# Patient Record
Sex: MALE | Race: WHITE | NOT HISPANIC OR LATINO | Employment: OTHER | URBAN - METROPOLITAN AREA
[De-identification: names, ages, dates, MRNs, and addresses within clinical notes are randomized per-mention and may not be internally consistent; named-entity substitution may affect disease eponyms.]

---

## 2018-08-07 ENCOUNTER — HOSPITAL ENCOUNTER (OUTPATIENT)
Dept: URGENT CARE | Facility: CLINIC | Age: 73
Setting detail: SPECIMEN
Discharge: HOME | End: 2018-08-07
Attending: FAMILY MEDICINE | Admitting: FAMILY MEDICINE

## 2018-08-07 LAB
BACTERIA SPEC AEROBE CULT: NORMAL
GRAM STN SPEC: NORMAL
Lab: NORMAL
MICRO REPORT STATUS: NORMAL
SPECIMEN SOURCE: NORMAL

## 2018-08-21 ENCOUNTER — HOSPITAL ENCOUNTER (OUTPATIENT)
Dept: URGENT CARE | Facility: CLINIC | Age: 73
Setting detail: SPECIMEN
Discharge: HOME | End: 2018-08-21
Attending: FAMILY MEDICINE | Admitting: FAMILY MEDICINE

## 2019-08-01 ENCOUNTER — APPOINTMENT (OUTPATIENT)
Dept: CT IMAGING | Facility: HOSPITAL | Age: 74
End: 2019-08-01

## 2019-08-01 ENCOUNTER — HOSPITAL ENCOUNTER (EMERGENCY)
Facility: HOSPITAL | Age: 74
Discharge: HOME | End: 2019-08-01
Admitting: EMERGENCY MEDICINE

## 2019-08-01 VITALS
BODY MASS INDEX: 35.28 KG/M2 | RESPIRATION RATE: 16 BRPM | HEIGHT: 71 IN | WEIGHT: 252 LBS | SYSTOLIC BLOOD PRESSURE: 207 MMHG | OXYGEN SATURATION: 99 % | HEART RATE: 69 BPM | TEMPERATURE: 98.2 F | DIASTOLIC BLOOD PRESSURE: 93 MMHG

## 2019-08-01 DIAGNOSIS — R11.2 NAUSEA AND VOMITING, INTRACTABILITY OF VOMITING NOT SPECIFIED, UNSPECIFIED VOMITING TYPE: ICD-10-CM

## 2019-08-01 DIAGNOSIS — R10.30 LOWER ABDOMINAL PAIN: Primary | ICD-10-CM

## 2019-08-01 DIAGNOSIS — N28.1 RENAL CYST, RIGHT: ICD-10-CM

## 2019-08-01 DIAGNOSIS — N20.0 URINARY TRACT OBSTRUCTION BY KIDNEY STONE: ICD-10-CM

## 2019-08-01 DIAGNOSIS — N13.8 URINARY TRACT OBSTRUCTION BY KIDNEY STONE: ICD-10-CM

## 2019-08-01 LAB
ALBUMIN SERPL-MCNC: 4.1 G/DL (ref 3.5–4.8)
ALBUMIN/GLOB SERPL: 1.6 G/DL (ref 1–1.7)
ALP SERPL-CCNC: 54 U/L (ref 32–91)
ALT SERPL W P-5'-P-CCNC: 19 U/L (ref 17–63)
ANION GAP SERPL CALCULATED.3IONS-SCNC: 15.4 MMOL/L (ref 5–15)
AST SERPL-CCNC: 20 U/L (ref 15–41)
BACTERIA UR QL AUTO: ABNORMAL /HPF
BASOPHILS # BLD AUTO: 0 10*3/MM3 (ref 0–0.2)
BASOPHILS NFR BLD AUTO: 0.2 % (ref 0–1.5)
BILIRUB SERPL-MCNC: 1 MG/DL (ref 0.3–1.2)
BILIRUB UR QL STRIP: NEGATIVE
BUN BLD-MCNC: 22 MG/DL (ref 8–20)
BUN/CREAT SERPL: 14.7 (ref 6.2–20.3)
CALCIUM SPEC-SCNC: 9.6 MG/DL (ref 8.9–10.3)
CHLORIDE SERPL-SCNC: 103 MMOL/L (ref 101–111)
CLARITY UR: CLEAR
CO2 SERPL-SCNC: 24 MMOL/L (ref 22–32)
COLOR UR: YELLOW
CREAT BLD-MCNC: 1.5 MG/DL (ref 0.7–1.2)
DEPRECATED RDW RBC AUTO: 42.4 FL (ref 37–54)
EOSINOPHIL # BLD AUTO: 0.1 10*3/MM3 (ref 0–0.4)
EOSINOPHIL NFR BLD AUTO: 1.7 % (ref 0.3–6.2)
ERYTHROCYTE [DISTWIDTH] IN BLOOD BY AUTOMATED COUNT: 14.4 % (ref 12.3–15.4)
GFR SERPL CREATININE-BSD FRML MDRD: 46 ML/MIN/1.73
GLOBULIN UR ELPH-MCNC: 2.6 GM/DL (ref 2.5–3.8)
GLUCOSE BLD-MCNC: 157 MG/DL (ref 65–99)
GLUCOSE UR STRIP-MCNC: NEGATIVE MG/DL
HCT VFR BLD AUTO: 34.1 % (ref 37.5–51)
HGB BLD-MCNC: 11.4 G/DL (ref 13–17.7)
HGB UR QL STRIP.AUTO: ABNORMAL
HOLD SPECIMEN: NORMAL
HOLD SPECIMEN: NORMAL
HYALINE CASTS UR QL AUTO: ABNORMAL /LPF
KETONES UR QL STRIP: NEGATIVE
LEUKOCYTE ESTERASE UR QL STRIP.AUTO: ABNORMAL
LIPASE SERPL-CCNC: 30 U/L (ref 22–51)
LYMPHOCYTES # BLD AUTO: 1 10*3/MM3 (ref 0.7–3.1)
LYMPHOCYTES NFR BLD AUTO: 13.4 % (ref 19.6–45.3)
MCH RBC QN AUTO: 28.1 PG (ref 26.6–33)
MCHC RBC AUTO-ENTMCNC: 33.3 G/DL (ref 31.5–35.7)
MCV RBC AUTO: 84.5 FL (ref 79–97)
MONOCYTES # BLD AUTO: 0.6 10*3/MM3 (ref 0.1–0.9)
MONOCYTES NFR BLD AUTO: 7.8 % (ref 5–12)
NEUTROPHILS # BLD AUTO: 5.7 10*3/MM3 (ref 1.7–7)
NEUTROPHILS NFR BLD AUTO: 76.9 % (ref 42.7–76)
NITRITE UR QL STRIP: NEGATIVE
NRBC BLD AUTO-RTO: 0 /100 WBC (ref 0–0.2)
PH UR STRIP.AUTO: 6 [PH] (ref 5–8)
PLATELET # BLD AUTO: 216 10*3/MM3 (ref 140–450)
PMV BLD AUTO: 9.4 FL (ref 6–12)
POTASSIUM BLD-SCNC: 4.4 MMOL/L (ref 3.6–5.1)
PROT SERPL-MCNC: 6.7 G/DL (ref 6.1–7.9)
PROT UR QL STRIP: ABNORMAL
RBC # BLD AUTO: 4.04 10*6/MM3 (ref 4.14–5.8)
RBC # UR: ABNORMAL /HPF
REF LAB TEST METHOD: ABNORMAL
SODIUM BLD-SCNC: 138 MMOL/L (ref 136–144)
SP GR UR STRIP: 1.03 (ref 1–1.03)
SQUAMOUS #/AREA URNS HPF: ABNORMAL /HPF
UROBILINOGEN UR QL STRIP: ABNORMAL
WBC NRBC COR # BLD: 7.4 10*3/MM3 (ref 3.4–10.8)
WBC UR QL AUTO: ABNORMAL /HPF
WHOLE BLOOD HOLD SPECIMEN: NORMAL
WHOLE BLOOD HOLD SPECIMEN: NORMAL

## 2019-08-01 PROCEDURE — 85025 COMPLETE CBC W/AUTO DIFF WBC: CPT | Performed by: PHYSICIAN ASSISTANT

## 2019-08-01 PROCEDURE — 96374 THER/PROPH/DIAG INJ IV PUSH: CPT

## 2019-08-01 PROCEDURE — 25010000002 ONDANSETRON PER 1 MG: Performed by: PHYSICIAN ASSISTANT

## 2019-08-01 PROCEDURE — 83690 ASSAY OF LIPASE: CPT | Performed by: PHYSICIAN ASSISTANT

## 2019-08-01 PROCEDURE — 74176 CT ABD & PELVIS W/O CONTRAST: CPT

## 2019-08-01 PROCEDURE — 81001 URINALYSIS AUTO W/SCOPE: CPT | Performed by: PHYSICIAN ASSISTANT

## 2019-08-01 PROCEDURE — 80053 COMPREHEN METABOLIC PANEL: CPT | Performed by: PHYSICIAN ASSISTANT

## 2019-08-01 PROCEDURE — 99284 EMERGENCY DEPT VISIT MOD MDM: CPT

## 2019-08-01 RX ORDER — HYDROCODONE BITARTRATE AND ACETAMINOPHEN 5; 325 MG/1; MG/1
1 TABLET ORAL EVERY 6 HOURS PRN
Qty: 12 TABLET | Refills: 0 | Status: SHIPPED | OUTPATIENT
Start: 2019-08-01 | End: 2020-10-26

## 2019-08-01 RX ORDER — ONDANSETRON 4 MG/1
4 TABLET, ORALLY DISINTEGRATING ORAL EVERY 8 HOURS PRN
Qty: 20 TABLET | Refills: 0 | Status: SHIPPED | OUTPATIENT
Start: 2019-08-01 | End: 2020-10-26

## 2019-08-01 RX ORDER — MORPHINE SULFATE 4 MG/ML
4 INJECTION, SOLUTION INTRAMUSCULAR; INTRAVENOUS ONCE
Status: DISCONTINUED | OUTPATIENT
Start: 2019-08-01 | End: 2019-08-01

## 2019-08-01 RX ORDER — SODIUM CHLORIDE 0.9 % (FLUSH) 0.9 %
10 SYRINGE (ML) INJECTION AS NEEDED
Status: DISCONTINUED | OUTPATIENT
Start: 2019-08-01 | End: 2019-08-01 | Stop reason: HOSPADM

## 2019-08-01 RX ORDER — ONDANSETRON 2 MG/ML
4 INJECTION INTRAMUSCULAR; INTRAVENOUS ONCE
Status: COMPLETED | OUTPATIENT
Start: 2019-08-01 | End: 2019-08-01

## 2019-08-01 RX ADMIN — ONDANSETRON 4 MG: 2 INJECTION INTRAMUSCULAR; INTRAVENOUS at 16:14

## 2019-08-01 RX ADMIN — SODIUM CHLORIDE 500 ML: 900 INJECTION, SOLUTION INTRAVENOUS at 16:14

## 2019-08-01 NOTE — ED NOTES
Pt reports lower abdominal pain for 9 days. Pt had few episodes of diarrhea, nausea, & vomiting.      Devika Hillman, RN  08/01/19 5683

## 2019-08-01 NOTE — ED PROVIDER NOTES
Subjective   History of Present Illness  Patient is a 74-year-old male [facility] significant for hypertension, hyperlipidemia, DM2, history of prostate cancer presents with lower abdominal pain for the past 9 days.  States he was sent from urgent care for further work-up.  Patient describes his pain as a constant cramping type pain.  He denies any radiation of the pain.  He denies any alleviating or exacerbating factors of his pain.  Currently rates it a 5/10 in severity.  Reports associated nausea states he did vomit twice yesterday.  Denies any hematemesis.  Also reports watery diarrhea once today.  Denies any melena or hematochezia.  Patient denies any recent travel the country, pain, shortness of air, fever, urinary symptoms, body aches or chills.  Patient reports he was on an antibiotic last month for kidney stones but has finished the full course.  . Review of Systems   Constitutional: Negative.    Respiratory: Negative.    Cardiovascular: Negative.    Gastrointestinal: Positive for abdominal distention, abdominal pain, diarrhea, nausea and vomiting. Negative for blood in stool and constipation.   Genitourinary: Negative for difficulty urinating, dysuria, flank pain and hematuria.   Skin: Negative.    Neurological: Negative.        Past Medical History:   Diagnosis Date   • Diabetes mellitus (CMS/HCC)    • Hypertension        Allergies   Allergen Reactions   • Erythromycin GI Intolerance       Past Surgical History:   Procedure Laterality Date   • BACK SURGERY     • HERNIA REPAIR     • JOINT REPLACEMENT     • ROTATOR CUFF REPAIR         No family history on file.    Social History     Socioeconomic History   • Marital status:      Spouse name: Not on file   • Number of children: Not on file   • Years of education: Not on file   • Highest education level: Not on file   Tobacco Use   • Smoking status: Never Smoker   • Smokeless tobacco: Never Used   Substance and Sexual Activity   • Alcohol use: No      "Frequency: Never   • Drug use: No   • Sexual activity: Defer           Objective   Physical Exam  The patient is well developed, well nourished, alert, cooperative and in no acute distress.    HEENT exam is normocephalic atraumatic. Pupils are equal round and reactive to light. EOMI. Conjunctiva noninjected, sclera anicteric, lids without ptosis, edema, or erythema. Mucous membranes are moist.     Lungs are clear to auscultation bilaterally chest wall expansion equal without retraction    Cardiovascular:  Regular rate and rhythm without murmur there is no peripheral edema, cyanosis or pallor.  Extremities are warm and well perfused.  Capillary refill less than 2 second    Abdomen is soft, generalized tenderness to palpation across lower quadrants, distention noted.  No guarding or rebound noted . Bowel sounds are present.  No hernias or masses are felt.     Back shows no CVA tenderness.  Neck and back show no spinal tenderness or bony step-off.  There is no asymmetry of spinal muscles , no tenderness, decreased range of motion or muscular spasm.      Neurologic:  Alert and oriented without focal neurological deficits.     Skin shows no rash, petechiae, or purpura.    Procedures           ED Course    /82 (BP Location: Right arm, Patient Position: Sitting)   Pulse 62   Temp 98.6 °F (37 °C) (Oral)   Resp 16   Ht 180.3 cm (71\")   Wt 114 kg (252 lb)   SpO2 96%   BMI 35.15 kg/m²   Medications   sodium chloride 0.9 % flush 10 mL (not administered)   sodium chloride 0.9 % bolus 500 mL (not administered)   sodium chloride 0.9 % bolus 500 mL (0 mL Intravenous Stopped 8/1/19 1735)   ondansetron (ZOFRAN) injection 4 mg (4 mg Intravenous Given 8/1/19 1614)     Ct Abdomen Pelvis Without Contrast    Result Date: 8/1/2019   1. 6 mm stone at the left ureterovesical junction causing moderate obstructive uropathy. 2. Bilateral intrarenal calculi. 3. Renal masses felt to represent cysts. The cysts in the right kidney are " stable and complex. 4. Left adrenal mass which is unchanged and consistent with an adenoma. 5. Additional findings as described above. The exam is limited by noncontrast technique.  Electronically Signed By-Apolinar Ramos On:8/1/2019 6:40 PM This report was finalized on [id number] by  Apolinar Ramos, .    Labs Reviewed   COMPREHENSIVE METABOLIC PANEL - Abnormal; Notable for the following components:       Result Value    Glucose 157 (*)     BUN 22 (*)     Creatinine 1.50 (*)     eGFR Non  Amer 46 (*)     Anion Gap 15.4 (*)     All other components within normal limits    Narrative:     The MDRD GFR formula is only valid for adults with stable renal function between ages 18 and 70.   URINALYSIS W/ MICROSCOPIC IF INDICATED (NO CULTURE) - Abnormal; Notable for the following components:    Blood, UA Small (1+) (*)     Protein, UA 30 mg/dL (1+) (*)     Leuk Esterase, UA Small (1+) (*)     All other components within normal limits   CBC WITH AUTO DIFFERENTIAL - Abnormal; Notable for the following components:    RBC 4.04 (*)     Hemoglobin 11.4 (*)     Hematocrit 34.1 (*)     Neutrophil % 76.9 (*)     Lymphocyte % 13.4 (*)     All other components within normal limits   URINALYSIS, MICROSCOPIC ONLY - Abnormal; Notable for the following components:    RBC, UA 6-12 (*)     WBC, UA 13-20 (*)     All other components within normal limits   LIPASE - Normal   GIARDIA / CRYPTOSPORIDIUM ANTIGENS   STOOL CULTURE   RAINBOW DRAW    Narrative:     The following orders were created for panel order [place] Draw.  Procedure                               Abnormality         Status                     ---------                               -----------         ------                     Light Blue Top[[id number]]                                   Final result               Green Top (Gel)[[id number]]                                  Final result               Lavender Top[[id number]]                                     Final result                [facility][[id number]]                                   Final result                 Please view results for these tests on the individual orders.   FECAL LACTOFERRIN   ROTAVIRUS ANTIGEN, STOOL   CBC AND DIFFERENTIAL    Narrative:     The following orders were created for panel order CBC & Differential.  Procedure                               Abnormality         Status                     ---------                               -----------         ------                     CBC Auto Differential[[id number]]        Abnormal            Final result                 Please view results for these tests on the individual orders.   LIGHT BLUE TOP   GREEN TOP   LAVENDER TOP   [facility]                     MDM  Number of Diagnoses or Management Options  Lower abdominal pain:   Nausea and vomiting, intractability of vomiting not specified, unspecified vomiting type:   Renal cyst, right:   Urinary tract obstruction by kidney stone:   Diagnosis management comments: Chart Review:  Comorbidity: HTN, hyperlipidemia, DM 2, history of prostate cancer  Differentials: DKA, intra-abdominal infection, dissection, peritonitis, peptic ulcer disease, pancreatitis, hepatitis, ischemic bowel, bowel obstruction, appendicitis    ;this list is not all inclusive and does not constitute the entirety of considered causes  Labs: UA showed 6-12 RBCs 13-20 WBCs, small leukocyte esterase, small blood 30 protein no bacteria seen.  CBC showed WBC 7.4, hemoglobin 9.4, hematocrit 34.1, platelets 216.  Old charts were reviewed from 7 months ago which showed a hemoglobin of 12.5, hematocrit 37.1.  CMP showed glucose 157, BUN 22, creatinine 1.5, potassium 4.4, sodium 138 all charts were reviewed from 7 months ago which showed BUN is 17 creatinine 1.2 sodium 134 potassium 3.8  Imaging: Was interpreted by physician and reviewed by myself:  Ct Abdomen Pelvis Without Contrast  Result Date: 8/1/2019   1. 6 mm stone at the left ureterovesical  junction causing moderate obstructive uropathy. 2. Bilateral intrarenal calculi. 3. Renal masses felt to represent cysts. The cysts in the right kidney are stable and complex. 4. Left adrenal mass which is unchanged and consistent with an adenoma. 5. Additional findings as described above. The exam is limited by noncontrast technique.  Electronically Signed By-Apolinar Ramos On:8/1/2019 6:40 PM This report was finalized on [id number] by  Apolinar Ramos, .      Disposition/Treatment:  While in the ED IV was placed and labs were obtained.  Patient declined pain medication.  Patient was given Zofran and fluids after reassessment patient was feeling better.  CT showed 6 mm stone causing moderate obstruction spoke to [facility]  he states Dr. Elaine will see the patient in office tomorrow morning.  Inspect was performed.  Patient was given Norco and Zofran for home patient was instructed to be NPO. at midnight.  Lab results and findings were discussed with the patient and family at bedside who voiced understanding of discharge instructions along with signs and symptoms requiring return to the ED.  Upon discharged patient was in stable condition with followup for a revaluation.        Amount and/or Complexity of Data Reviewed  Clinical lab tests: reviewed          Final diagnoses:   Lower abdominal pain   Urinary tract obstruction by kidney stone   Renal cyst, right   Nausea and vomiting, intractability of vomiting not specified, unspecified vomiting type            Bailee Mcdaniel PA  08/01/19 1926

## 2019-08-05 ENCOUNTER — TRANSCRIBE ORDERS (OUTPATIENT)
Dept: ADMINISTRATIVE | Facility: HOSPITAL | Age: 74
End: 2019-08-05

## 2019-08-05 ENCOUNTER — HOSPITAL ENCOUNTER (OUTPATIENT)
Dept: GENERAL RADIOLOGY | Facility: HOSPITAL | Age: 74
Discharge: HOME | End: 2019-08-05
Admitting: UROLOGY

## 2019-08-05 DIAGNOSIS — N20.0 RENAL STONES: Primary | ICD-10-CM

## 2019-08-05 DIAGNOSIS — N20.0 RENAL STONES: ICD-10-CM

## 2019-08-05 PROCEDURE — 74018 RADEX ABDOMEN 1 VIEW: CPT

## 2020-10-08 ENCOUNTER — HOSPITAL ENCOUNTER (EMERGENCY)
Facility: HOSPITAL | Age: 75
Discharge: HOME | End: 2020-10-08
Attending: EMERGENCY MEDICINE | Admitting: EMERGENCY MEDICINE

## 2020-10-08 ENCOUNTER — APPOINTMENT (OUTPATIENT)
Dept: CT IMAGING | Facility: HOSPITAL | Age: 75
End: 2020-10-08

## 2020-10-08 VITALS
RESPIRATION RATE: 17 BRPM | SYSTOLIC BLOOD PRESSURE: 160 MMHG | TEMPERATURE: 97.5 F | DIASTOLIC BLOOD PRESSURE: 75 MMHG | HEART RATE: 72 BPM | BODY MASS INDEX: 36.67 KG/M2 | HEIGHT: 71 IN | WEIGHT: 261.91 LBS | OXYGEN SATURATION: 97 %

## 2020-10-08 DIAGNOSIS — N20.1 URETERAL CALCULUS: Primary | ICD-10-CM

## 2020-10-08 DIAGNOSIS — N23 URETERAL COLIC: ICD-10-CM

## 2020-10-08 LAB
BACTERIA UR QL AUTO: ABNORMAL /HPF
BILIRUB UR QL STRIP: NEGATIVE
CLARITY UR: CLEAR
COLOR UR: YELLOW
GLUCOSE UR STRIP-MCNC: ABNORMAL MG/DL
HGB UR QL STRIP.AUTO: ABNORMAL
HYALINE CASTS UR QL AUTO: ABNORMAL /LPF
KETONES UR QL STRIP: NEGATIVE
LEUKOCYTE ESTERASE UR QL STRIP.AUTO: NEGATIVE
NITRITE UR QL STRIP: NEGATIVE
PH UR STRIP.AUTO: 5.5 [PH] (ref 5–8)
PROT UR QL STRIP: ABNORMAL
RBC # UR: ABNORMAL /HPF
REF LAB TEST METHOD: ABNORMAL
SP GR UR STRIP: 1.02 (ref 1–1.03)
SQUAMOUS #/AREA URNS HPF: ABNORMAL /HPF
UROBILINOGEN UR QL STRIP: ABNORMAL
WBC UR QL AUTO: ABNORMAL /HPF

## 2020-10-08 PROCEDURE — 25010000002 ONDANSETRON PER 1 MG: Performed by: EMERGENCY MEDICINE

## 2020-10-08 PROCEDURE — 81001 URINALYSIS AUTO W/SCOPE: CPT | Performed by: EMERGENCY MEDICINE

## 2020-10-08 PROCEDURE — 99284 EMERGENCY DEPT VISIT MOD MDM: CPT

## 2020-10-08 PROCEDURE — 25010000002 MORPHINE PER 10 MG: Performed by: EMERGENCY MEDICINE

## 2020-10-08 PROCEDURE — 96375 TX/PRO/DX INJ NEW DRUG ADDON: CPT

## 2020-10-08 PROCEDURE — 96374 THER/PROPH/DIAG INJ IV PUSH: CPT

## 2020-10-08 PROCEDURE — 74176 CT ABD & PELVIS W/O CONTRAST: CPT

## 2020-10-08 RX ORDER — MORPHINE SULFATE 4 MG/ML
4 INJECTION, SOLUTION INTRAMUSCULAR; INTRAVENOUS ONCE
Status: COMPLETED | OUTPATIENT
Start: 2020-10-08 | End: 2020-10-08

## 2020-10-08 RX ORDER — ONDANSETRON 2 MG/ML
4 INJECTION INTRAMUSCULAR; INTRAVENOUS ONCE
Status: COMPLETED | OUTPATIENT
Start: 2020-10-08 | End: 2020-10-08

## 2020-10-08 RX ORDER — HYDROCODONE BITARTRATE AND ACETAMINOPHEN 7.5; 325 MG/1; MG/1
1-2 TABLET ORAL EVERY 6 HOURS PRN
Qty: 15 TABLET | Refills: 0 | Status: SHIPPED | OUTPATIENT
Start: 2020-10-08 | End: 2022-06-13 | Stop reason: HOSPADM

## 2020-10-08 RX ORDER — SODIUM CHLORIDE 0.9 % (FLUSH) 0.9 %
10 SYRINGE (ML) INJECTION AS NEEDED
Status: DISCONTINUED | OUTPATIENT
Start: 2020-10-08 | End: 2020-10-08 | Stop reason: HOSPADM

## 2020-10-08 RX ADMIN — ONDANSETRON 4 MG: 2 INJECTION INTRAMUSCULAR; INTRAVENOUS at 19:42

## 2020-10-08 RX ADMIN — MORPHINE SULFATE 4 MG: 4 INJECTION INTRAVENOUS at 19:42

## 2020-10-09 NOTE — ED PROVIDER NOTES
Subjective   Patient is a 75-year-old male complaining of left flank pain for the past few hours.  Also planes of mild nausea.  States pain is moderate and constant.  There is no radiation of pain.          Review of Systems  Negative for cough fever abdominal pain vomiting diarrhea dysuria achiness weight loss or other complaint  Past Medical History:   Diagnosis Date   • Diabetes mellitus (CMS/HCC)    • Hypertension        Allergies   Allergen Reactions   • Erythromycin GI Intolerance       Past Surgical History:   Procedure Laterality Date   • BACK SURGERY     • HERNIA REPAIR     • JOINT REPLACEMENT     • ROTATOR CUFF REPAIR         No family history on file.    Social History     Socioeconomic History   • Marital status:      Spouse name: Not on file   • Number of children: Not on file   • Years of education: Not on file   • Highest education level: Not on file   Tobacco Use   • Smoking status: Never Smoker   • Smokeless tobacco: Never Used   Substance and Sexual Activity   • Alcohol use: No     Frequency: Never   • Drug use: No   • Sexual activity: Defer           Objective   Physical Exam  Lungs are clear.  Heart has regular rhythm without murmur gallop.  Chest is nontender.  Abdomen soft with mild left flank tenderness.  Patient has normal bowel sounds without rebound or guard.  Back has no CVA tenderness.  Procedures           ED Course      Results for orders placed or performed during the hospital encounter of 10/08/20   Urinalysis With Culture If Indicated - Urine, Clean Catch    Specimen: Urine, Clean Catch   Result Value Ref Range    Color, UA Yellow Yellow, Straw    Appearance, UA Clear Clear    pH, UA 5.5 5.0 - 8.0    Specific Gravity, UA 1.024 1.005 - 1.030    Glucose, UA >=1000 mg/dL (3+) (A) Negative    Ketones, UA Negative Negative    Bilirubin, UA Negative Negative    Blood, UA Small (1+) (A) Negative    Protein,  mg/dL (2+) (A) Negative    Leuk Esterase, UA Negative Negative     Nitrite, UA Negative Negative    Urobilinogen, UA 0.2 E.U./dL 0.2 - 1.0 E.U./dL   Urinalysis, Microscopic Only - Urine, Clean Catch    Specimen: Urine, Clean Catch   Result Value Ref Range    RBC, UA 13-20 (A) None Seen /HPF    WBC, UA 0-2 (A) None Seen /HPF    Bacteria, UA None Seen None Seen /HPF    Squamous Epithelial Cells, UA 0-2 None Seen, 0-2 /HPF    Hyaline Casts, UA None Seen None Seen /LPF    Methodology Automated Microscopy      No radiology results for the last day                                         MDM  Number of Diagnoses or Management Options  Diagnosis management comments: Patient has ureteral stone with colic.  On reexamination he is improving.  He will be discharged with a prescription for hydrocodone.  Will follow with his urologist for further outpatient evaluation as needed.  There is no evidence infectious process or metabolic abnormality       Amount and/or Complexity of Data Reviewed  Clinical lab tests: reviewed  Tests in the radiology section of CPT®: reviewed    Risk of Complications, Morbidity, and/or Mortality  Presenting problems: high  Diagnostic procedures: high  Management options: high    Patient Progress  Patient progress: stable      Final diagnoses:   Ureteral calculus   Ureteral colic            Nathaniel Colunga MD  10/08/20 2102       Nathaniel Colunga MD  10/16/20 1645

## 2020-10-26 ENCOUNTER — APPOINTMENT (OUTPATIENT)
Dept: CT IMAGING | Facility: HOSPITAL | Age: 75
End: 2020-10-26

## 2020-10-26 ENCOUNTER — ANESTHESIA EVENT (OUTPATIENT)
Dept: PERIOP | Facility: HOSPITAL | Age: 75
End: 2020-10-26

## 2020-10-26 ENCOUNTER — HOSPITAL ENCOUNTER (OUTPATIENT)
Facility: HOSPITAL | Age: 75
Discharge: HOME | End: 2020-10-27
Attending: STUDENT IN AN ORGANIZED HEALTH CARE EDUCATION/TRAINING PROGRAM | Admitting: STUDENT IN AN ORGANIZED HEALTH CARE EDUCATION/TRAINING PROGRAM

## 2020-10-26 ENCOUNTER — ANESTHESIA (OUTPATIENT)
Dept: PERIOP | Facility: HOSPITAL | Age: 75
End: 2020-10-26

## 2020-10-26 DIAGNOSIS — N20.1 LEFT URETERAL STONE: ICD-10-CM

## 2020-10-26 DIAGNOSIS — R10.9 LEFT FLANK PAIN: Primary | ICD-10-CM

## 2020-10-26 PROBLEM — N20.0 KIDNEY STONE: Status: ACTIVE | Noted: 2020-10-26

## 2020-10-26 LAB
ALBUMIN SERPL-MCNC: 4.3 G/DL (ref 3.5–5.2)
ALBUMIN/GLOB SERPL: 2 G/DL
ALP SERPL-CCNC: 54 U/L (ref 39–117)
ALT SERPL W P-5'-P-CCNC: 14 U/L (ref 1–41)
ANION GAP SERPL CALCULATED.3IONS-SCNC: 13 MMOL/L (ref 5–15)
AST SERPL-CCNC: 12 U/L (ref 1–40)
BACTERIA UR QL AUTO: ABNORMAL /HPF
BASOPHILS # BLD AUTO: 0 10*3/MM3 (ref 0–0.2)
BASOPHILS NFR BLD AUTO: 0.1 % (ref 0–1.5)
BILIRUB SERPL-MCNC: 0.3 MG/DL (ref 0–1.2)
BILIRUB UR QL STRIP: NEGATIVE
BUN SERPL-MCNC: 20 MG/DL (ref 8–23)
BUN/CREAT SERPL: 15.7 (ref 7–25)
CALCIUM SPEC-SCNC: 9.9 MG/DL (ref 8.6–10.5)
CHLORIDE SERPL-SCNC: 101 MMOL/L (ref 98–107)
CLARITY UR: ABNORMAL
CO2 SERPL-SCNC: 24 MMOL/L (ref 22–29)
COLOR UR: YELLOW
CREAT SERPL-MCNC: 1.27 MG/DL (ref 0.76–1.27)
DEPRECATED RDW RBC AUTO: 44.2 FL (ref 37–54)
EOSINOPHIL # BLD AUTO: 0.1 10*3/MM3 (ref 0–0.4)
EOSINOPHIL NFR BLD AUTO: 0.9 % (ref 0.3–6.2)
ERYTHROCYTE [DISTWIDTH] IN BLOOD BY AUTOMATED COUNT: 14.7 % (ref 12.3–15.4)
GFR SERPL CREATININE-BSD FRML MDRD: 55 ML/MIN/1.73
GLOBULIN UR ELPH-MCNC: 2.2 GM/DL
GLUCOSE BLDC GLUCOMTR-MCNC: 142 MG/DL (ref 70–105)
GLUCOSE BLDC GLUCOMTR-MCNC: 158 MG/DL (ref 70–105)
GLUCOSE BLDC GLUCOMTR-MCNC: 284 MG/DL (ref 70–105)
GLUCOSE SERPL-MCNC: 259 MG/DL (ref 65–99)
GLUCOSE UR STRIP-MCNC: ABNORMAL MG/DL
HBA1C MFR BLD: 8.1 % (ref 3.5–5.6)
HCT VFR BLD AUTO: 38.2 % (ref 37.5–51)
HGB BLD-MCNC: 12.7 G/DL (ref 13–17.7)
HGB UR QL STRIP.AUTO: ABNORMAL
HYALINE CASTS UR QL AUTO: ABNORMAL /LPF
KETONES UR QL STRIP: NEGATIVE
LEUKOCYTE ESTERASE UR QL STRIP.AUTO: NEGATIVE
LIPASE SERPL-CCNC: 105 U/L (ref 13–60)
LYMPHOCYTES # BLD AUTO: 0.8 10*3/MM3 (ref 0.7–3.1)
LYMPHOCYTES NFR BLD AUTO: 10.1 % (ref 19.6–45.3)
MCH RBC QN AUTO: 28.9 PG (ref 26.6–33)
MCHC RBC AUTO-ENTMCNC: 33.3 G/DL (ref 31.5–35.7)
MCV RBC AUTO: 86.9 FL (ref 79–97)
MONOCYTES # BLD AUTO: 0.5 10*3/MM3 (ref 0.1–0.9)
MONOCYTES NFR BLD AUTO: 6.7 % (ref 5–12)
NEUTROPHILS NFR BLD AUTO: 6.4 10*3/MM3 (ref 1.7–7)
NEUTROPHILS NFR BLD AUTO: 82.2 % (ref 42.7–76)
NITRITE UR QL STRIP: NEGATIVE
NRBC BLD AUTO-RTO: 0 /100 WBC (ref 0–0.2)
PH UR STRIP.AUTO: <=5 [PH] (ref 5–8)
PLATELET # BLD AUTO: 192 10*3/MM3 (ref 140–450)
PMV BLD AUTO: 8.6 FL (ref 6–12)
POTASSIUM SERPL-SCNC: 4.3 MMOL/L (ref 3.5–5.2)
PROT SERPL-MCNC: 6.5 G/DL (ref 6–8.5)
PROT UR QL STRIP: ABNORMAL
RBC # BLD AUTO: 4.4 10*6/MM3 (ref 4.14–5.8)
RBC # UR: ABNORMAL /HPF
REF LAB TEST METHOD: ABNORMAL
SARS-COV-2 RNA PNL SPEC NAA+PROBE: NOT DETECTED
SODIUM SERPL-SCNC: 138 MMOL/L (ref 136–145)
SP GR UR STRIP: >=1.03 (ref 1–1.03)
SQUAMOUS #/AREA URNS HPF: ABNORMAL /HPF
UROBILINOGEN UR QL STRIP: ABNORMAL
WBC # BLD AUTO: 7.8 10*3/MM3 (ref 3.4–10.8)
WBC UR QL AUTO: ABNORMAL /HPF
WHOLE BLOOD HOLD SPECIMEN: NORMAL

## 2020-10-26 PROCEDURE — 80053 COMPREHEN METABOLIC PANEL: CPT | Performed by: NURSE PRACTITIONER

## 2020-10-26 PROCEDURE — A9270 NON-COVERED ITEM OR SERVICE: HCPCS | Performed by: NURSE PRACTITIONER

## 2020-10-26 PROCEDURE — 63710000001 GABAPENTIN 100 MG CAPSULE: Performed by: NURSE PRACTITIONER

## 2020-10-26 PROCEDURE — 96375 TX/PRO/DX INJ NEW DRUG ADDON: CPT

## 2020-10-26 PROCEDURE — 25010000002 PROPOFOL 10 MG/ML EMULSION: Performed by: ANESTHESIOLOGY

## 2020-10-26 PROCEDURE — 25010000002 FENTANYL CITRATE (PF) 100 MCG/2ML SOLUTION: Performed by: ANESTHESIOLOGY

## 2020-10-26 PROCEDURE — 0 IOHEXOL 300 MG/ML SOLUTION: Performed by: UROLOGY

## 2020-10-26 PROCEDURE — 82962 GLUCOSE BLOOD TEST: CPT

## 2020-10-26 PROCEDURE — C9803 HOPD COVID-19 SPEC COLLECT: HCPCS

## 2020-10-26 PROCEDURE — C1769 GUIDE WIRE: HCPCS | Performed by: UROLOGY

## 2020-10-26 PROCEDURE — 85025 COMPLETE CBC W/AUTO DIFF WBC: CPT | Performed by: NURSE PRACTITIONER

## 2020-10-26 PROCEDURE — 83036 HEMOGLOBIN GLYCOSYLATED A1C: CPT | Performed by: NURSE PRACTITIONER

## 2020-10-26 PROCEDURE — 81001 URINALYSIS AUTO W/SCOPE: CPT | Performed by: NURSE PRACTITIONER

## 2020-10-26 PROCEDURE — G0378 HOSPITAL OBSERVATION PER HR: HCPCS

## 2020-10-26 PROCEDURE — 74177 CT ABD & PELVIS W/CONTRAST: CPT

## 2020-10-26 PROCEDURE — 25010000002 MORPHINE PER 10 MG: Performed by: NURSE PRACTITIONER

## 2020-10-26 PROCEDURE — 25010000002 ONDANSETRON PER 1 MG: Performed by: ANESTHESIOLOGY

## 2020-10-26 PROCEDURE — 99219 PR INITIAL OBSERVATION CARE/DAY 50 MINUTES: CPT | Performed by: NURSE PRACTITIONER

## 2020-10-26 PROCEDURE — 25010000002 DEXAMETHASONE PER 1 MG: Performed by: ANESTHESIOLOGY

## 2020-10-26 PROCEDURE — 83690 ASSAY OF LIPASE: CPT | Performed by: NURSE PRACTITIONER

## 2020-10-26 PROCEDURE — C1758 CATHETER, URETERAL: HCPCS | Performed by: UROLOGY

## 2020-10-26 PROCEDURE — 87635 SARS-COV-2 COVID-19 AMP PRB: CPT | Performed by: NURSE PRACTITIONER

## 2020-10-26 PROCEDURE — 63710000001 ATORVASTATIN 10 MG TABLET: Performed by: NURSE PRACTITIONER

## 2020-10-26 PROCEDURE — 25010000002 MIDAZOLAM PER 1 MG: Performed by: ANESTHESIOLOGY

## 2020-10-26 PROCEDURE — C2617 STENT, NON-COR, TEM W/O DEL: HCPCS | Performed by: UROLOGY

## 2020-10-26 PROCEDURE — 96374 THER/PROPH/DIAG INJ IV PUSH: CPT

## 2020-10-26 PROCEDURE — 99285 EMERGENCY DEPT VISIT HI MDM: CPT

## 2020-10-26 PROCEDURE — 63710000001 TAMSULOSIN 0.4 MG CAPSULE: Performed by: NURSE PRACTITIONER

## 2020-10-26 PROCEDURE — 0 IOPAMIDOL PER 1 ML: Performed by: NURSE PRACTITIONER

## 2020-10-26 PROCEDURE — 25010000002 ONDANSETRON PER 1 MG: Performed by: NURSE PRACTITIONER

## 2020-10-26 PROCEDURE — 25010000002 CEFAZOLIN PER 500 MG: Performed by: ANESTHESIOLOGY

## 2020-10-26 PROCEDURE — 25010000002 NEOSTIGMINE 10 MG/10ML SOLUTION: Performed by: ANESTHESIOLOGY

## 2020-10-26 PROCEDURE — 25010000002 KETOROLAC TROMETHAMINE PER 15 MG: Performed by: ANESTHESIOLOGY

## 2020-10-26 DEVICE — URETERAL STENT
Type: IMPLANTABLE DEVICE | Site: URETER | Status: FUNCTIONAL
Brand: PERCUFLEX™ PLUS

## 2020-10-26 RX ORDER — LOSARTAN POTASSIUM 25 MG/1
25 TABLET ORAL DAILY
COMMUNITY
End: 2022-06-01

## 2020-10-26 RX ORDER — NAPROXEN 500 MG/1
500 TABLET ORAL 2 TIMES DAILY PRN
COMMUNITY
End: 2022-06-01

## 2020-10-26 RX ORDER — NALOXONE HCL 0.4 MG/ML
0.4 VIAL (ML) INJECTION
Status: DISCONTINUED | OUTPATIENT
Start: 2020-10-26 | End: 2020-10-27 | Stop reason: HOSPADM

## 2020-10-26 RX ORDER — INSULIN LISPRO 100 [IU]/ML
0-7 INJECTION, SOLUTION INTRAVENOUS; SUBCUTANEOUS AS NEEDED
Status: DISCONTINUED | OUTPATIENT
Start: 2020-10-26 | End: 2020-10-27 | Stop reason: HOSPADM

## 2020-10-26 RX ORDER — ONDANSETRON 2 MG/ML
4 INJECTION INTRAMUSCULAR; INTRAVENOUS EVERY 6 HOURS PRN
Status: DISCONTINUED | OUTPATIENT
Start: 2020-10-26 | End: 2020-10-27 | Stop reason: HOSPADM

## 2020-10-26 RX ORDER — INSULIN LISPRO 100 [IU]/ML
0-7 INJECTION, SOLUTION INTRAVENOUS; SUBCUTANEOUS
Status: DISCONTINUED | OUTPATIENT
Start: 2020-10-26 | End: 2020-10-27 | Stop reason: HOSPADM

## 2020-10-26 RX ORDER — ROCURONIUM BROMIDE 10 MG/ML
INJECTION, SOLUTION INTRAVENOUS AS NEEDED
Status: DISCONTINUED | OUTPATIENT
Start: 2020-10-26 | End: 2020-10-26 | Stop reason: SURG

## 2020-10-26 RX ORDER — ONDANSETRON 2 MG/ML
INJECTION INTRAMUSCULAR; INTRAVENOUS AS NEEDED
Status: DISCONTINUED | OUTPATIENT
Start: 2020-10-26 | End: 2020-10-26 | Stop reason: SURG

## 2020-10-26 RX ORDER — ACETAMINOPHEN 650 MG/1
650 SUPPOSITORY RECTAL EVERY 4 HOURS PRN
Status: DISCONTINUED | OUTPATIENT
Start: 2020-10-26 | End: 2020-10-27 | Stop reason: HOSPADM

## 2020-10-26 RX ORDER — SODIUM CHLORIDE 0.9 % (FLUSH) 0.9 %
10 SYRINGE (ML) INJECTION AS NEEDED
Status: DISCONTINUED | OUTPATIENT
Start: 2020-10-26 | End: 2020-10-26 | Stop reason: HOSPADM

## 2020-10-26 RX ORDER — HYDROCHLOROTHIAZIDE 25 MG/1
25 TABLET ORAL DAILY
COMMUNITY

## 2020-10-26 RX ORDER — NEOSTIGMINE METHYLSULFATE 1 MG/ML
INJECTION, SOLUTION INTRAVENOUS AS NEEDED
Status: DISCONTINUED | OUTPATIENT
Start: 2020-10-26 | End: 2020-10-26 | Stop reason: SURG

## 2020-10-26 RX ORDER — ONDANSETRON 2 MG/ML
4 INJECTION INTRAMUSCULAR; INTRAVENOUS ONCE
Status: COMPLETED | OUTPATIENT
Start: 2020-10-26 | End: 2020-10-26

## 2020-10-26 RX ORDER — SILDENAFIL 100 MG/1
100 TABLET, FILM COATED ORAL DAILY PRN
COMMUNITY

## 2020-10-26 RX ORDER — MIDAZOLAM HYDROCHLORIDE 1 MG/ML
INJECTION INTRAMUSCULAR; INTRAVENOUS AS NEEDED
Status: DISCONTINUED | OUTPATIENT
Start: 2020-10-26 | End: 2020-10-26 | Stop reason: SURG

## 2020-10-26 RX ORDER — HYDROCODONE BITARTRATE AND ACETAMINOPHEN 7.5; 325 MG/1; MG/1
2 TABLET ORAL EVERY 4 HOURS PRN
Status: DISCONTINUED | OUTPATIENT
Start: 2020-10-26 | End: 2020-10-27 | Stop reason: HOSPADM

## 2020-10-26 RX ORDER — LOSARTAN POTASSIUM 25 MG/1
25 TABLET ORAL DAILY
Status: DISCONTINUED | OUTPATIENT
Start: 2020-10-27 | End: 2020-10-27 | Stop reason: HOSPADM

## 2020-10-26 RX ORDER — ALUMINA, MAGNESIA, AND SIMETHICONE 2400; 2400; 240 MG/30ML; MG/30ML; MG/30ML
15 SUSPENSION ORAL EVERY 6 HOURS PRN
Status: DISCONTINUED | OUTPATIENT
Start: 2020-10-26 | End: 2020-10-27 | Stop reason: HOSPADM

## 2020-10-26 RX ORDER — DEXTROSE MONOHYDRATE 25 G/50ML
25 INJECTION, SOLUTION INTRAVENOUS
Status: DISCONTINUED | OUTPATIENT
Start: 2020-10-26 | End: 2020-10-27 | Stop reason: HOSPADM

## 2020-10-26 RX ORDER — SODIUM CHLORIDE 9 MG/ML
100 INJECTION, SOLUTION INTRAVENOUS CONTINUOUS
Status: DISCONTINUED | OUTPATIENT
Start: 2020-10-26 | End: 2020-10-27 | Stop reason: HOSPADM

## 2020-10-26 RX ORDER — SIMVASTATIN 20 MG
10 TABLET ORAL NIGHTLY
COMMUNITY

## 2020-10-26 RX ORDER — ACETAMINOPHEN 325 MG/1
650 TABLET ORAL EVERY 4 HOURS PRN
Status: DISCONTINUED | OUTPATIENT
Start: 2020-10-26 | End: 2020-10-26

## 2020-10-26 RX ORDER — SODIUM CHLORIDE 0.9 % (FLUSH) 0.9 %
10 SYRINGE (ML) INJECTION EVERY 12 HOURS SCHEDULED
Status: DISCONTINUED | OUTPATIENT
Start: 2020-10-26 | End: 2020-10-27 | Stop reason: HOSPADM

## 2020-10-26 RX ORDER — BISACODYL 5 MG/1
5 TABLET, DELAYED RELEASE ORAL DAILY PRN
Status: DISCONTINUED | OUTPATIENT
Start: 2020-10-26 | End: 2020-10-27 | Stop reason: HOSPADM

## 2020-10-26 RX ORDER — FENTANYL CITRATE 50 UG/ML
INJECTION, SOLUTION INTRAMUSCULAR; INTRAVENOUS AS NEEDED
Status: DISCONTINUED | OUTPATIENT
Start: 2020-10-26 | End: 2020-10-26 | Stop reason: SURG

## 2020-10-26 RX ORDER — ATROPA BELLADONNA AND OPIUM 16.2; 3 MG/1; MG/1
30 SUPPOSITORY RECTAL DAILY PRN
Status: DISCONTINUED | OUTPATIENT
Start: 2020-10-26 | End: 2020-10-27 | Stop reason: HOSPADM

## 2020-10-26 RX ORDER — HYDROMORPHONE HCL 110MG/55ML
0.5 PATIENT CONTROLLED ANALGESIA SYRINGE INTRAVENOUS
Status: DISCONTINUED | OUTPATIENT
Start: 2020-10-26 | End: 2020-10-27 | Stop reason: HOSPADM

## 2020-10-26 RX ORDER — SODIUM CHLORIDE 0.9 % (FLUSH) 0.9 %
10 SYRINGE (ML) INJECTION AS NEEDED
Status: DISCONTINUED | OUTPATIENT
Start: 2020-10-26 | End: 2020-10-27 | Stop reason: HOSPADM

## 2020-10-26 RX ORDER — TAMSULOSIN HYDROCHLORIDE 0.4 MG/1
0.8 CAPSULE ORAL NIGHTLY
Status: DISCONTINUED | OUTPATIENT
Start: 2020-10-26 | End: 2020-10-27 | Stop reason: HOSPADM

## 2020-10-26 RX ORDER — NICOTINE POLACRILEX 4 MG
15 LOZENGE BUCCAL
Status: DISCONTINUED | OUTPATIENT
Start: 2020-10-26 | End: 2020-10-27 | Stop reason: HOSPADM

## 2020-10-26 RX ORDER — HYDROMORPHONE HCL 110MG/55ML
0.5 PATIENT CONTROLLED ANALGESIA SYRINGE INTRAVENOUS
Status: DISCONTINUED | OUTPATIENT
Start: 2020-10-26 | End: 2020-10-26 | Stop reason: HOSPADM

## 2020-10-26 RX ORDER — PROPOFOL 10 MG/ML
VIAL (ML) INTRAVENOUS AS NEEDED
Status: DISCONTINUED | OUTPATIENT
Start: 2020-10-26 | End: 2020-10-26 | Stop reason: SURG

## 2020-10-26 RX ORDER — ACETAMINOPHEN 325 MG/1
650 TABLET ORAL EVERY 4 HOURS PRN
Status: DISCONTINUED | OUTPATIENT
Start: 2020-10-26 | End: 2020-10-27 | Stop reason: HOSPADM

## 2020-10-26 RX ORDER — ATORVASTATIN CALCIUM 10 MG/1
10 TABLET, FILM COATED ORAL NIGHTLY
Status: DISCONTINUED | OUTPATIENT
Start: 2020-10-26 | End: 2020-10-27 | Stop reason: HOSPADM

## 2020-10-26 RX ORDER — BISACODYL 10 MG
10 SUPPOSITORY, RECTAL RECTAL DAILY PRN
Status: DISCONTINUED | OUTPATIENT
Start: 2020-10-26 | End: 2020-10-27 | Stop reason: HOSPADM

## 2020-10-26 RX ORDER — MORPHINE SULFATE 4 MG/ML
4 INJECTION, SOLUTION INTRAMUSCULAR; INTRAVENOUS ONCE
Status: COMPLETED | OUTPATIENT
Start: 2020-10-26 | End: 2020-10-26

## 2020-10-26 RX ORDER — KETOROLAC TROMETHAMINE 30 MG/ML
INJECTION, SOLUTION INTRAMUSCULAR; INTRAVENOUS AS NEEDED
Status: DISCONTINUED | OUTPATIENT
Start: 2020-10-26 | End: 2020-10-26 | Stop reason: SURG

## 2020-10-26 RX ORDER — ONDANSETRON 2 MG/ML
4 INJECTION INTRAMUSCULAR; INTRAVENOUS EVERY 6 HOURS PRN
Status: DISCONTINUED | OUTPATIENT
Start: 2020-10-26 | End: 2020-10-26

## 2020-10-26 RX ORDER — GABAPENTIN 100 MG/1
100 CAPSULE ORAL 3 TIMES DAILY
Status: DISCONTINUED | OUTPATIENT
Start: 2020-10-26 | End: 2020-10-27 | Stop reason: HOSPADM

## 2020-10-26 RX ORDER — CHOLECALCIFEROL (VITAMIN D3) 125 MCG
5 CAPSULE ORAL NIGHTLY PRN
Status: DISCONTINUED | OUTPATIENT
Start: 2020-10-26 | End: 2020-10-27 | Stop reason: HOSPADM

## 2020-10-26 RX ORDER — SODIUM CHLORIDE, SODIUM LACTATE, POTASSIUM CHLORIDE, CALCIUM CHLORIDE 600; 310; 30; 20 MG/100ML; MG/100ML; MG/100ML; MG/100ML
1000 INJECTION, SOLUTION INTRAVENOUS CONTINUOUS
Status: DISCONTINUED | OUTPATIENT
Start: 2020-10-26 | End: 2020-10-27 | Stop reason: HOSPADM

## 2020-10-26 RX ORDER — NALOXONE HCL 0.4 MG/ML
0.1 VIAL (ML) INJECTION
Status: DISCONTINUED | OUTPATIENT
Start: 2020-10-26 | End: 2020-10-27 | Stop reason: HOSPADM

## 2020-10-26 RX ORDER — FENTANYL CITRATE 50 UG/ML
50 INJECTION, SOLUTION INTRAMUSCULAR; INTRAVENOUS
Status: DISCONTINUED | OUTPATIENT
Start: 2020-10-26 | End: 2020-10-26 | Stop reason: HOSPADM

## 2020-10-26 RX ORDER — ONDANSETRON 4 MG/1
4 TABLET, FILM COATED ORAL EVERY 6 HOURS PRN
Status: DISCONTINUED | OUTPATIENT
Start: 2020-10-26 | End: 2020-10-26

## 2020-10-26 RX ORDER — GLYCOPYRROLATE 1 MG/5 ML
SYRINGE (ML) INTRAVENOUS AS NEEDED
Status: DISCONTINUED | OUTPATIENT
Start: 2020-10-26 | End: 2020-10-26 | Stop reason: SURG

## 2020-10-26 RX ORDER — ONDANSETRON 4 MG/1
4 TABLET, FILM COATED ORAL EVERY 6 HOURS PRN
Status: DISCONTINUED | OUTPATIENT
Start: 2020-10-26 | End: 2020-10-27 | Stop reason: HOSPADM

## 2020-10-26 RX ORDER — GABAPENTIN 100 MG/1
100 CAPSULE ORAL 2 TIMES DAILY
COMMUNITY

## 2020-10-26 RX ORDER — ACETAMINOPHEN 650 MG/1
650 SUPPOSITORY RECTAL EVERY 4 HOURS PRN
Status: DISCONTINUED | OUTPATIENT
Start: 2020-10-26 | End: 2020-10-26

## 2020-10-26 RX ORDER — ACETAMINOPHEN 160 MG/5ML
650 SOLUTION ORAL EVERY 4 HOURS PRN
Status: DISCONTINUED | OUTPATIENT
Start: 2020-10-26 | End: 2020-10-27 | Stop reason: HOSPADM

## 2020-10-26 RX ORDER — MORPHINE SULFATE 4 MG/ML
4 INJECTION, SOLUTION INTRAMUSCULAR; INTRAVENOUS
Status: DISCONTINUED | OUTPATIENT
Start: 2020-10-26 | End: 2020-10-27 | Stop reason: HOSPADM

## 2020-10-26 RX ORDER — SODIUM CHLORIDE 0.9 % (FLUSH) 0.9 %
10 SYRINGE (ML) INJECTION EVERY 12 HOURS SCHEDULED
Status: DISCONTINUED | OUTPATIENT
Start: 2020-10-26 | End: 2020-10-26 | Stop reason: HOSPADM

## 2020-10-26 RX ORDER — TAMSULOSIN HYDROCHLORIDE 0.4 MG/1
2 CAPSULE ORAL NIGHTLY
COMMUNITY

## 2020-10-26 RX ORDER — DEXAMETHASONE SODIUM PHOSPHATE 4 MG/ML
INJECTION, SOLUTION INTRA-ARTICULAR; INTRALESIONAL; INTRAMUSCULAR; INTRAVENOUS; SOFT TISSUE AS NEEDED
Status: DISCONTINUED | OUTPATIENT
Start: 2020-10-26 | End: 2020-10-26 | Stop reason: SURG

## 2020-10-26 RX ORDER — ONDANSETRON 2 MG/ML
4 INJECTION INTRAMUSCULAR; INTRAVENOUS ONCE AS NEEDED
Status: DISCONTINUED | OUTPATIENT
Start: 2020-10-26 | End: 2020-10-26 | Stop reason: HOSPADM

## 2020-10-26 RX ADMIN — CEFAZOLIN SODIUM 2 G: 10 INJECTION, POWDER, FOR SOLUTION INTRAVENOUS at 23:40

## 2020-10-26 RX ADMIN — FENTANYL CITRATE 50 MCG: 50 INJECTION, SOLUTION INTRAMUSCULAR; INTRAVENOUS at 16:17

## 2020-10-26 RX ADMIN — GABAPENTIN 100 MG: 100 CAPSULE ORAL at 20:49

## 2020-10-26 RX ADMIN — MORPHINE SULFATE 4 MG: 4 INJECTION INTRAVENOUS at 12:04

## 2020-10-26 RX ADMIN — FENTANYL CITRATE 50 MCG: 50 INJECTION, SOLUTION INTRAMUSCULAR; INTRAVENOUS at 16:24

## 2020-10-26 RX ADMIN — SODIUM CHLORIDE 100 ML/HR: 9 INJECTION, SOLUTION INTRAVENOUS at 20:47

## 2020-10-26 RX ADMIN — MIDAZOLAM 2 MG: 1 INJECTION INTRAMUSCULAR; INTRAVENOUS at 16:17

## 2020-10-26 RX ADMIN — IOPAMIDOL 100 ML: 755 INJECTION, SOLUTION INTRAVENOUS at 12:40

## 2020-10-26 RX ADMIN — TAMSULOSIN HYDROCHLORIDE 0.8 MG: 0.4 CAPSULE ORAL at 20:50

## 2020-10-26 RX ADMIN — FENTANYL CITRATE 50 MCG: 50 INJECTION, SOLUTION INTRAMUSCULAR; INTRAVENOUS at 16:42

## 2020-10-26 RX ADMIN — ONDANSETRON 4 MG: 2 INJECTION INTRAMUSCULAR; INTRAVENOUS at 16:26

## 2020-10-26 RX ADMIN — CEFAZOLIN SODIUM 2 G: 10 INJECTION, POWDER, FOR SOLUTION INTRAVENOUS at 16:23

## 2020-10-26 RX ADMIN — ROCURONIUM BROMIDE 15 MG: 10 INJECTION, SOLUTION INTRAVENOUS at 16:35

## 2020-10-26 RX ADMIN — DEXAMETHASONE SODIUM PHOSPHATE 4 MG: 4 INJECTION, SOLUTION INTRAMUSCULAR; INTRAVENOUS at 16:26

## 2020-10-26 RX ADMIN — NEOSTIGMINE METHYLSULFATE 4 MG: 1 INJECTION, SOLUTION INTRAVENOUS at 16:43

## 2020-10-26 RX ADMIN — PROPOFOL 150 MG: 10 INJECTION, EMULSION INTRAVENOUS at 16:19

## 2020-10-26 RX ADMIN — Medication 10 ML: at 20:49

## 2020-10-26 RX ADMIN — ATORVASTATIN CALCIUM 10 MG: 10 TABLET, FILM COATED ORAL at 20:49

## 2020-10-26 RX ADMIN — ONDANSETRON 4 MG: 2 INJECTION INTRAMUSCULAR; INTRAVENOUS at 12:15

## 2020-10-26 RX ADMIN — KETOROLAC TROMETHAMINE 30 MG: 30 INJECTION, SOLUTION INTRAMUSCULAR at 16:26

## 2020-10-26 RX ADMIN — FENTANYL CITRATE 50 MCG: 50 INJECTION, SOLUTION INTRAMUSCULAR; INTRAVENOUS at 16:29

## 2020-10-26 RX ADMIN — SODIUM CHLORIDE, POTASSIUM CHLORIDE, SODIUM LACTATE AND CALCIUM CHLORIDE 1000 ML: 600; 310; 30; 20 INJECTION, SOLUTION INTRAVENOUS at 15:44

## 2020-10-26 RX ADMIN — Medication 0.4 MG: at 16:43

## 2020-10-26 NOTE — ANESTHESIA PROCEDURE NOTES
Airway  Urgency: elective    Date/Time: 10/26/2020 4:20 PM  Airway not difficult    General Information and Staff    Patient location during procedure: OR  Anesthesiologist: Rogelio Fierro MD    Indications and Patient Condition  Indications for airway management: airway protection    Preoxygenated: yes  Mask difficulty assessment: 0 - not attempted    Final Airway Details  Final airway type: supraglottic airway      Successful airway: LMA  Size 4    Number of attempts at approach: 1  Assessment: lips, teeth, and gum same as pre-op and atraumatic intubation

## 2020-10-26 NOTE — ANESTHESIA PREPROCEDURE EVALUATION
Anesthesia Evaluation     Patient summary reviewed and Nursing notes reviewed   NPO Solid Status: > 8 hours  NPO Liquid Status: > 8 hours           Airway   Mallampati: II  TM distance: >3 FB  Neck ROM: full  No difficulty expected  Dental - normal exam     Pulmonary - negative pulmonary ROS and normal exam   Cardiovascular - normal exam    (+) hypertension, hyperlipidemia,       Neuro/Psych- negative ROS  GI/Hepatic/Renal/Endo    (+) obesity,   diabetes mellitus,     Musculoskeletal (-) negative ROS    Abdominal  - normal exam    Bowel sounds: normal.   Substance History - negative use     OB/GYN negative ob/gyn ROS         Other                        Anesthesia Plan    ASA 3     general     intravenous induction     Anesthetic plan, all risks, benefits, and alternatives have been provided, discussed and informed consent has been obtained with: patient.

## 2020-10-27 VITALS
WEIGHT: 261.25 LBS | OXYGEN SATURATION: 96 % | HEART RATE: 74 BPM | RESPIRATION RATE: 18 BRPM | DIASTOLIC BLOOD PRESSURE: 80 MMHG | SYSTOLIC BLOOD PRESSURE: 162 MMHG | TEMPERATURE: 97.9 F | BODY MASS INDEX: 36.57 KG/M2 | HEIGHT: 71 IN

## 2020-10-27 LAB
ANION GAP SERPL CALCULATED.3IONS-SCNC: 10 MMOL/L (ref 5–15)
BASOPHILS # BLD AUTO: 0 10*3/MM3 (ref 0–0.2)
BASOPHILS NFR BLD AUTO: 0 % (ref 0–1.5)
BUN SERPL-MCNC: 17 MG/DL (ref 8–23)
BUN/CREAT SERPL: 13.8 (ref 7–25)
CALCIUM SPEC-SCNC: 8.8 MG/DL (ref 8.6–10.5)
CHLORIDE SERPL-SCNC: 104 MMOL/L (ref 98–107)
CO2 SERPL-SCNC: 24 MMOL/L (ref 22–29)
CREAT SERPL-MCNC: 1.23 MG/DL (ref 0.76–1.27)
DEPRECATED RDW RBC AUTO: 44.2 FL (ref 37–54)
EOSINOPHIL # BLD AUTO: 0 10*3/MM3 (ref 0–0.4)
EOSINOPHIL NFR BLD AUTO: 0.2 % (ref 0.3–6.2)
ERYTHROCYTE [DISTWIDTH] IN BLOOD BY AUTOMATED COUNT: 14.5 % (ref 12.3–15.4)
GFR SERPL CREATININE-BSD FRML MDRD: 57 ML/MIN/1.73
GLUCOSE BLDC GLUCOMTR-MCNC: 196 MG/DL (ref 70–105)
GLUCOSE BLDC GLUCOMTR-MCNC: 208 MG/DL (ref 70–105)
GLUCOSE SERPL-MCNC: 222 MG/DL (ref 65–99)
HCT VFR BLD AUTO: 36.3 % (ref 37.5–51)
HGB BLD-MCNC: 12.4 G/DL (ref 13–17.7)
LYMPHOCYTES # BLD AUTO: 0.7 10*3/MM3 (ref 0.7–3.1)
LYMPHOCYTES NFR BLD AUTO: 9.3 % (ref 19.6–45.3)
MCH RBC QN AUTO: 29.5 PG (ref 26.6–33)
MCHC RBC AUTO-ENTMCNC: 34.1 G/DL (ref 31.5–35.7)
MCV RBC AUTO: 86.4 FL (ref 79–97)
MONOCYTES # BLD AUTO: 0.4 10*3/MM3 (ref 0.1–0.9)
MONOCYTES NFR BLD AUTO: 4.9 % (ref 5–12)
NEUTROPHILS NFR BLD AUTO: 6.8 10*3/MM3 (ref 1.7–7)
NEUTROPHILS NFR BLD AUTO: 85.6 % (ref 42.7–76)
NRBC BLD AUTO-RTO: 0.1 /100 WBC (ref 0–0.2)
PLATELET # BLD AUTO: 197 10*3/MM3 (ref 140–450)
PMV BLD AUTO: 8.5 FL (ref 6–12)
POTASSIUM SERPL-SCNC: 4.7 MMOL/L (ref 3.5–5.2)
RBC # BLD AUTO: 4.2 10*6/MM3 (ref 4.14–5.8)
SODIUM SERPL-SCNC: 138 MMOL/L (ref 136–145)
WBC # BLD AUTO: 8 10*3/MM3 (ref 3.4–10.8)

## 2020-10-27 PROCEDURE — 63710000001 GABAPENTIN 100 MG CAPSULE: Performed by: NURSE PRACTITIONER

## 2020-10-27 PROCEDURE — A9270 NON-COVERED ITEM OR SERVICE: HCPCS | Performed by: NURSE PRACTITIONER

## 2020-10-27 PROCEDURE — 63710000001 LOSARTAN 25 MG TABLET: Performed by: NURSE PRACTITIONER

## 2020-10-27 PROCEDURE — 25010000002 CEFAZOLIN PER 500 MG: Performed by: UROLOGY

## 2020-10-27 PROCEDURE — 82962 GLUCOSE BLOOD TEST: CPT

## 2020-10-27 PROCEDURE — 80048 BASIC METABOLIC PNL TOTAL CA: CPT | Performed by: NURSE PRACTITIONER

## 2020-10-27 PROCEDURE — 63710000001 INSULIN LISPRO (HUMAN) PER 5 UNITS: Performed by: NURSE PRACTITIONER

## 2020-10-27 PROCEDURE — 99217 PR OBSERVATION CARE DISCHARGE MANAGEMENT: CPT | Performed by: STUDENT IN AN ORGANIZED HEALTH CARE EDUCATION/TRAINING PROGRAM

## 2020-10-27 PROCEDURE — 85025 COMPLETE CBC W/AUTO DIFF WBC: CPT | Performed by: NURSE PRACTITIONER

## 2020-10-27 PROCEDURE — G0378 HOSPITAL OBSERVATION PER HR: HCPCS

## 2020-10-27 RX ADMIN — INSULIN LISPRO 2 UNITS: 100 INJECTION, SOLUTION INTRAVENOUS; SUBCUTANEOUS at 11:57

## 2020-10-27 RX ADMIN — GABAPENTIN 100 MG: 100 CAPSULE ORAL at 08:01

## 2020-10-27 RX ADMIN — LOSARTAN POTASSIUM 25 MG: 25 TABLET, FILM COATED ORAL at 08:01

## 2020-10-27 RX ADMIN — CEFAZOLIN SODIUM 2 G: 10 INJECTION, POWDER, FOR SOLUTION INTRAVENOUS at 08:02

## 2020-10-27 RX ADMIN — INSULIN LISPRO 4 UNITS: 100 INJECTION, SOLUTION INTRAVENOUS; SUBCUTANEOUS at 08:01

## 2020-10-27 RX ADMIN — Medication 10 ML: at 08:17

## 2020-11-02 ENCOUNTER — HOSPITAL ENCOUNTER (OUTPATIENT)
Dept: CARDIOLOGY | Facility: HOSPITAL | Age: 75
Discharge: HOME | End: 2020-11-02
Admitting: UROLOGY

## 2020-11-02 ENCOUNTER — TRANSCRIBE ORDERS (OUTPATIENT)
Dept: ADMINISTRATIVE | Facility: HOSPITAL | Age: 75
End: 2020-11-02

## 2020-11-02 DIAGNOSIS — N20.1 CALCULUS OF URETER: ICD-10-CM

## 2020-11-02 DIAGNOSIS — N20.1 CALCULUS OF URETER: Primary | ICD-10-CM

## 2020-11-02 PROCEDURE — 93005 ELECTROCARDIOGRAM TRACING: CPT | Performed by: UROLOGY

## 2020-11-02 PROCEDURE — 93010 ELECTROCARDIOGRAM REPORT: CPT | Performed by: INTERNAL MEDICINE

## 2020-11-15 LAB — QT INTERVAL: 425 MS

## 2021-01-26 ENCOUNTER — TRANSCRIBE ORDERS (OUTPATIENT)
Dept: ADMINISTRATIVE | Facility: HOSPITAL | Age: 76
End: 2021-01-26

## 2021-01-26 ENCOUNTER — HOSPITAL ENCOUNTER (OUTPATIENT)
Dept: CARDIOLOGY | Facility: HOSPITAL | Age: 76
Discharge: HOME | End: 2021-01-26

## 2021-01-26 ENCOUNTER — LAB (OUTPATIENT)
Dept: LAB | Facility: HOSPITAL | Age: 76
End: 2021-01-26

## 2021-01-26 DIAGNOSIS — N20.0 RENAL STONES: ICD-10-CM

## 2021-01-26 DIAGNOSIS — Z01.818 PREOP TESTING: ICD-10-CM

## 2021-01-26 DIAGNOSIS — N20.0 RENAL STONES: Primary | ICD-10-CM

## 2021-01-26 LAB
ANION GAP SERPL CALCULATED.3IONS-SCNC: 9.2 MMOL/L (ref 5–15)
BASOPHILS # BLD AUTO: 0.02 10*3/MM3 (ref 0–0.2)
BASOPHILS NFR BLD AUTO: 0.3 % (ref 0–1.5)
BUN SERPL-MCNC: 20 MG/DL (ref 8–23)
BUN/CREAT SERPL: 16.9 (ref 7–25)
CALCIUM SPEC-SCNC: 10.1 MG/DL (ref 8.6–10.5)
CHLORIDE SERPL-SCNC: 101 MMOL/L (ref 98–107)
CO2 SERPL-SCNC: 29.8 MMOL/L (ref 22–29)
CREAT SERPL-MCNC: 1.18 MG/DL (ref 0.76–1.27)
DEPRECATED RDW RBC AUTO: 41.6 FL (ref 37–54)
EOSINOPHIL # BLD AUTO: 0.05 10*3/MM3 (ref 0–0.4)
EOSINOPHIL NFR BLD AUTO: 0.8 % (ref 0.3–6.2)
ERYTHROCYTE [DISTWIDTH] IN BLOOD BY AUTOMATED COUNT: 13.3 % (ref 12.3–15.4)
GFR SERPL CREATININE-BSD FRML MDRD: 60 ML/MIN/1.73
GLUCOSE SERPL-MCNC: 195 MG/DL (ref 65–99)
HCT VFR BLD AUTO: 37.7 % (ref 37.5–51)
HGB BLD-MCNC: 12.7 G/DL (ref 13–17.7)
IMM GRANULOCYTES # BLD AUTO: 0.1 10*3/MM3 (ref 0–0.05)
IMM GRANULOCYTES NFR BLD AUTO: 1.6 % (ref 0–0.5)
LYMPHOCYTES # BLD AUTO: 0.86 10*3/MM3 (ref 0.7–3.1)
LYMPHOCYTES NFR BLD AUTO: 13.8 % (ref 19.6–45.3)
MCH RBC QN AUTO: 28.8 PG (ref 26.6–33)
MCHC RBC AUTO-ENTMCNC: 33.7 G/DL (ref 31.5–35.7)
MCV RBC AUTO: 85.5 FL (ref 79–97)
MONOCYTES # BLD AUTO: 0.54 10*3/MM3 (ref 0.1–0.9)
MONOCYTES NFR BLD AUTO: 8.6 % (ref 5–12)
NEUTROPHILS NFR BLD AUTO: 4.68 10*3/MM3 (ref 1.7–7)
NEUTROPHILS NFR BLD AUTO: 74.9 % (ref 42.7–76)
NRBC BLD AUTO-RTO: 0 /100 WBC (ref 0–0.2)
PLATELET # BLD AUTO: 192 10*3/MM3 (ref 140–450)
PMV BLD AUTO: 11.2 FL (ref 6–12)
POTASSIUM SERPL-SCNC: 4.1 MMOL/L (ref 3.5–5.2)
RBC # BLD AUTO: 4.41 10*6/MM3 (ref 4.14–5.8)
SODIUM SERPL-SCNC: 140 MMOL/L (ref 136–145)
WBC # BLD AUTO: 6.25 10*3/MM3 (ref 3.4–10.8)

## 2021-01-26 PROCEDURE — 93005 ELECTROCARDIOGRAM TRACING: CPT | Performed by: UROLOGY

## 2021-01-26 PROCEDURE — 93010 ELECTROCARDIOGRAM REPORT: CPT | Performed by: INTERNAL MEDICINE

## 2021-01-26 PROCEDURE — 85025 COMPLETE CBC W/AUTO DIFF WBC: CPT

## 2021-01-26 PROCEDURE — 80048 BASIC METABOLIC PNL TOTAL CA: CPT

## 2021-01-26 PROCEDURE — 36415 COLL VENOUS BLD VENIPUNCTURE: CPT

## 2021-01-27 LAB — QT INTERVAL: 369 MS

## 2021-02-04 ENCOUNTER — HOSPITAL ENCOUNTER (OUTPATIENT)
Facility: HOSPITAL | Age: 76
Setting detail: OBSERVATION
Discharge: HOME | End: 2021-02-05
Attending: STUDENT IN AN ORGANIZED HEALTH CARE EDUCATION/TRAINING PROGRAM | Admitting: STUDENT IN AN ORGANIZED HEALTH CARE EDUCATION/TRAINING PROGRAM

## 2021-02-04 DIAGNOSIS — R10.9 FLANK PAIN: ICD-10-CM

## 2021-02-04 DIAGNOSIS — R11.0 NAUSEA: Primary | ICD-10-CM

## 2021-02-04 LAB
ALBUMIN SERPL-MCNC: 4.7 G/DL (ref 3.5–5.2)
ALBUMIN/GLOB SERPL: 1.7 G/DL
ALP SERPL-CCNC: 68 U/L (ref 39–117)
ALT SERPL W P-5'-P-CCNC: 17 U/L (ref 1–41)
ANION GAP SERPL CALCULATED.3IONS-SCNC: 10 MMOL/L (ref 5–15)
AST SERPL-CCNC: 18 U/L (ref 1–40)
BACTERIA UR QL AUTO: ABNORMAL /HPF
BILIRUB SERPL-MCNC: 0.4 MG/DL (ref 0–1.2)
BILIRUB UR QL STRIP: NEGATIVE
BUN SERPL-MCNC: 22 MG/DL (ref 8–23)
BUN/CREAT SERPL: 14.3 (ref 7–25)
CALCIUM SPEC-SCNC: 9.7 MG/DL (ref 8.6–10.5)
CHLORIDE SERPL-SCNC: 96 MMOL/L (ref 98–107)
CLARITY UR: ABNORMAL
CO2 SERPL-SCNC: 27 MMOL/L (ref 22–29)
COLOR UR: ABNORMAL
CREAT SERPL-MCNC: 1.54 MG/DL (ref 0.76–1.27)
D-LACTATE SERPL-SCNC: 1.3 MMOL/L (ref 0.5–2)
DEPRECATED RDW RBC AUTO: 43.8 FL (ref 37–54)
ERYTHROCYTE [DISTWIDTH] IN BLOOD BY AUTOMATED COUNT: 14.7 % (ref 12.3–15.4)
GFR SERPL CREATININE-BSD FRML MDRD: 44 ML/MIN/1.73
GLOBULIN UR ELPH-MCNC: 2.8 GM/DL
GLUCOSE SERPL-MCNC: 303 MG/DL (ref 65–99)
GLUCOSE UR STRIP-MCNC: ABNORMAL MG/DL
HCT VFR BLD AUTO: 41.2 % (ref 37.5–51)
HGB BLD-MCNC: 14.3 G/DL (ref 13–17.7)
HGB UR QL STRIP.AUTO: ABNORMAL
HYALINE CASTS UR QL AUTO: ABNORMAL /LPF
KETONES UR QL STRIP: ABNORMAL
LARGE PLATELETS: ABNORMAL
LEUKOCYTE ESTERASE UR QL STRIP.AUTO: NEGATIVE
LIPASE SERPL-CCNC: 116 U/L (ref 13–60)
LYMPHOCYTES # BLD MANUAL: 0.38 10*3/MM3 (ref 0.7–3.1)
LYMPHOCYTES NFR BLD MANUAL: 3 % (ref 19.6–45.3)
LYMPHOCYTES NFR BLD MANUAL: 3 % (ref 5–12)
MCH RBC QN AUTO: 29.6 PG (ref 26.6–33)
MCHC RBC AUTO-ENTMCNC: 34.7 G/DL (ref 31.5–35.7)
MCV RBC AUTO: 85.2 FL (ref 79–97)
MONOCYTES # BLD AUTO: 0.38 10*3/MM3 (ref 0.1–0.9)
NEUTROPHILS # BLD AUTO: 11.81 10*3/MM3 (ref 1.7–7)
NEUTROPHILS NFR BLD MANUAL: 93 % (ref 42.7–76)
NITRITE UR QL STRIP: POSITIVE
PH UR STRIP.AUTO: 6 [PH] (ref 5–8)
PLATELET # BLD AUTO: 211 10*3/MM3 (ref 140–450)
PMV BLD AUTO: 9 FL (ref 6–12)
POTASSIUM SERPL-SCNC: 4.6 MMOL/L (ref 3.5–5.2)
PROT SERPL-MCNC: 7.5 G/DL (ref 6–8.5)
PROT UR QL STRIP: ABNORMAL
RBC # BLD AUTO: 4.83 10*6/MM3 (ref 4.14–5.8)
RBC # UR: ABNORMAL /HPF
RBC MORPH BLD: NORMAL
REF LAB TEST METHOD: ABNORMAL
SCAN SLIDE: NORMAL
SMALL PLATELETS BLD QL SMEAR: ADEQUATE
SODIUM SERPL-SCNC: 133 MMOL/L (ref 136–145)
SP GR UR STRIP: 1.02 (ref 1–1.03)
SQUAMOUS #/AREA URNS HPF: ABNORMAL /HPF
UROBILINOGEN UR QL STRIP: ABNORMAL
VARIANT LYMPHS NFR BLD MANUAL: 1 % (ref 0–5)
WBC # BLD AUTO: 12.7 10*3/MM3 (ref 3.4–10.8)
WBC MORPH BLD: NORMAL
WBC UR QL AUTO: ABNORMAL /HPF

## 2021-02-04 PROCEDURE — 81001 URINALYSIS AUTO W/SCOPE: CPT | Performed by: NURSE PRACTITIONER

## 2021-02-04 PROCEDURE — 96376 TX/PRO/DX INJ SAME DRUG ADON: CPT

## 2021-02-04 PROCEDURE — 80053 COMPREHEN METABOLIC PANEL: CPT | Performed by: NURSE PRACTITIONER

## 2021-02-04 PROCEDURE — 99284 EMERGENCY DEPT VISIT MOD MDM: CPT

## 2021-02-04 PROCEDURE — 85007 BL SMEAR W/DIFF WBC COUNT: CPT | Performed by: NURSE PRACTITIONER

## 2021-02-04 PROCEDURE — 96375 TX/PRO/DX INJ NEW DRUG ADDON: CPT

## 2021-02-04 PROCEDURE — 83690 ASSAY OF LIPASE: CPT | Performed by: NURSE PRACTITIONER

## 2021-02-04 PROCEDURE — 25010000002 HYDROMORPHONE PER 4 MG: Performed by: NURSE PRACTITIONER

## 2021-02-04 PROCEDURE — 25010000002 ONDANSETRON PER 1 MG: Performed by: NURSE PRACTITIONER

## 2021-02-04 PROCEDURE — 85025 COMPLETE CBC W/AUTO DIFF WBC: CPT | Performed by: NURSE PRACTITIONER

## 2021-02-04 PROCEDURE — 83605 ASSAY OF LACTIC ACID: CPT

## 2021-02-04 PROCEDURE — 96374 THER/PROPH/DIAG INJ IV PUSH: CPT

## 2021-02-04 RX ORDER — HYDROMORPHONE HCL 110MG/55ML
0.5 PATIENT CONTROLLED ANALGESIA SYRINGE INTRAVENOUS ONCE
Status: COMPLETED | OUTPATIENT
Start: 2021-02-04 | End: 2021-02-04

## 2021-02-04 RX ORDER — ONDANSETRON 2 MG/ML
4 INJECTION INTRAMUSCULAR; INTRAVENOUS ONCE
Status: COMPLETED | OUTPATIENT
Start: 2021-02-04 | End: 2021-02-04

## 2021-02-04 RX ORDER — SODIUM CHLORIDE 0.9 % (FLUSH) 0.9 %
10 SYRINGE (ML) INJECTION AS NEEDED
Status: DISCONTINUED | OUTPATIENT
Start: 2021-02-04 | End: 2021-02-05 | Stop reason: HOSPADM

## 2021-02-04 RX ADMIN — HYDROMORPHONE HYDROCHLORIDE 0.5 MG: 2 INJECTION, SOLUTION INTRAMUSCULAR; INTRAVENOUS; SUBCUTANEOUS at 21:08

## 2021-02-04 RX ADMIN — HYDROMORPHONE HYDROCHLORIDE 0.5 MG: 2 INJECTION, SOLUTION INTRAMUSCULAR; INTRAVENOUS; SUBCUTANEOUS at 22:54

## 2021-02-04 RX ADMIN — ONDANSETRON 4 MG: 2 INJECTION, SOLUTION INTRAMUSCULAR; INTRAVENOUS at 22:54

## 2021-02-04 RX ADMIN — SODIUM CHLORIDE 1000 ML: 9 INJECTION, SOLUTION INTRAVENOUS at 21:08

## 2021-02-04 RX ADMIN — ONDANSETRON 4 MG: 2 INJECTION, SOLUTION INTRAMUSCULAR; INTRAVENOUS at 21:08

## 2021-02-05 VITALS
BODY MASS INDEX: 35.62 KG/M2 | HEIGHT: 71 IN | SYSTOLIC BLOOD PRESSURE: 147 MMHG | OXYGEN SATURATION: 96 % | WEIGHT: 254.41 LBS | RESPIRATION RATE: 20 BRPM | HEART RATE: 87 BPM | DIASTOLIC BLOOD PRESSURE: 89 MMHG | TEMPERATURE: 98 F

## 2021-02-05 PROBLEM — R11.2 PONV (POSTOPERATIVE NAUSEA AND VOMITING): Status: ACTIVE | Noted: 2021-02-05

## 2021-02-05 PROBLEM — E66.9 OBESITY (BMI 30-39.9): Chronic | Status: ACTIVE | Noted: 2021-02-05

## 2021-02-05 PROBLEM — Z98.890 PONV (POSTOPERATIVE NAUSEA AND VOMITING): Status: ACTIVE | Noted: 2021-02-05

## 2021-02-05 PROBLEM — R11.0 NAUSEA: Status: ACTIVE | Noted: 2021-02-05

## 2021-02-05 PROBLEM — N17.9 AKI (ACUTE KIDNEY INJURY) (HCC): Status: ACTIVE | Noted: 2021-02-05

## 2021-02-05 LAB
ANION GAP SERPL CALCULATED.3IONS-SCNC: 11 MMOL/L (ref 5–15)
BASOPHILS # BLD AUTO: 0 10*3/MM3 (ref 0–0.2)
BASOPHILS NFR BLD AUTO: 0 % (ref 0–1.5)
BUN SERPL-MCNC: 19 MG/DL (ref 8–23)
BUN/CREAT SERPL: 12.7 (ref 7–25)
CALCIUM SPEC-SCNC: 9.1 MG/DL (ref 8.6–10.5)
CHLORIDE SERPL-SCNC: 98 MMOL/L (ref 98–107)
CO2 SERPL-SCNC: 23 MMOL/L (ref 22–29)
CREAT SERPL-MCNC: 1.5 MG/DL (ref 0.76–1.27)
DEPRECATED RDW RBC AUTO: 43.8 FL (ref 37–54)
EOSINOPHIL # BLD AUTO: 0 10*3/MM3 (ref 0–0.4)
EOSINOPHIL NFR BLD AUTO: 0.4 % (ref 0.3–6.2)
ERYTHROCYTE [DISTWIDTH] IN BLOOD BY AUTOMATED COUNT: 14.6 % (ref 12.3–15.4)
GFR SERPL CREATININE-BSD FRML MDRD: 46 ML/MIN/1.73
GLUCOSE BLDC GLUCOMTR-MCNC: 216 MG/DL (ref 70–105)
GLUCOSE BLDC GLUCOMTR-MCNC: 250 MG/DL (ref 70–105)
GLUCOSE SERPL-MCNC: 228 MG/DL (ref 65–99)
HBA1C MFR BLD: 8 % (ref 3.5–5.6)
HCT VFR BLD AUTO: 38.1 % (ref 37.5–51)
HGB BLD-MCNC: 12.8 G/DL (ref 13–17.7)
LYMPHOCYTES # BLD AUTO: 1 10*3/MM3 (ref 0.7–3.1)
LYMPHOCYTES NFR BLD AUTO: 9.7 % (ref 19.6–45.3)
MCH RBC QN AUTO: 28.9 PG (ref 26.6–33)
MCHC RBC AUTO-ENTMCNC: 33.7 G/DL (ref 31.5–35.7)
MCV RBC AUTO: 85.8 FL (ref 79–97)
MONOCYTES # BLD AUTO: 1 10*3/MM3 (ref 0.1–0.9)
MONOCYTES NFR BLD AUTO: 10.3 % (ref 5–12)
NEUTROPHILS NFR BLD AUTO: 7.8 10*3/MM3 (ref 1.7–7)
NEUTROPHILS NFR BLD AUTO: 79.6 % (ref 42.7–76)
NRBC BLD AUTO-RTO: 0.1 /100 WBC (ref 0–0.2)
PLATELET # BLD AUTO: 225 10*3/MM3 (ref 140–450)
PMV BLD AUTO: 8.8 FL (ref 6–12)
POTASSIUM SERPL-SCNC: 4.1 MMOL/L (ref 3.5–5.2)
RBC # BLD AUTO: 4.44 10*6/MM3 (ref 4.14–5.8)
SARS-COV-2 ORF1AB RESP QL NAA+PROBE: NOT DETECTED
SODIUM SERPL-SCNC: 132 MMOL/L (ref 136–145)
WBC # BLD AUTO: 9.8 10*3/MM3 (ref 3.4–10.8)

## 2021-02-05 PROCEDURE — 63710000001 INSULIN LISPRO (HUMAN) PER 5 UNITS: Performed by: STUDENT IN AN ORGANIZED HEALTH CARE EDUCATION/TRAINING PROGRAM

## 2021-02-05 PROCEDURE — 96361 HYDRATE IV INFUSION ADD-ON: CPT

## 2021-02-05 PROCEDURE — G0378 HOSPITAL OBSERVATION PER HR: HCPCS

## 2021-02-05 PROCEDURE — 96376 TX/PRO/DX INJ SAME DRUG ADON: CPT

## 2021-02-05 PROCEDURE — 80048 BASIC METABOLIC PNL TOTAL CA: CPT | Performed by: STUDENT IN AN ORGANIZED HEALTH CARE EDUCATION/TRAINING PROGRAM

## 2021-02-05 PROCEDURE — 85025 COMPLETE CBC W/AUTO DIFF WBC: CPT | Performed by: STUDENT IN AN ORGANIZED HEALTH CARE EDUCATION/TRAINING PROGRAM

## 2021-02-05 PROCEDURE — 83036 HEMOGLOBIN GLYCOSYLATED A1C: CPT | Performed by: STUDENT IN AN ORGANIZED HEALTH CARE EDUCATION/TRAINING PROGRAM

## 2021-02-05 PROCEDURE — 82962 GLUCOSE BLOOD TEST: CPT

## 2021-02-05 PROCEDURE — 25010000002 ONDANSETRON PER 1 MG: Performed by: STUDENT IN AN ORGANIZED HEALTH CARE EDUCATION/TRAINING PROGRAM

## 2021-02-05 PROCEDURE — 99235 HOSP IP/OBS SAME DATE MOD 70: CPT | Performed by: STUDENT IN AN ORGANIZED HEALTH CARE EDUCATION/TRAINING PROGRAM

## 2021-02-05 PROCEDURE — C9803 HOPD COVID-19 SPEC COLLECT: HCPCS | Performed by: STUDENT IN AN ORGANIZED HEALTH CARE EDUCATION/TRAINING PROGRAM

## 2021-02-05 PROCEDURE — U0004 COV-19 TEST NON-CDC HGH THRU: HCPCS | Performed by: STUDENT IN AN ORGANIZED HEALTH CARE EDUCATION/TRAINING PROGRAM

## 2021-02-05 RX ORDER — PSEUDOEPHEDRINE HCL 30 MG
100 TABLET ORAL 2 TIMES DAILY PRN
Qty: 60 CAPSULE | Refills: 0 | Status: SHIPPED | OUTPATIENT
Start: 2021-02-05 | End: 2022-06-01

## 2021-02-05 RX ORDER — POLYETHYLENE GLYCOL 3350 17 G/17G
17 POWDER, FOR SOLUTION ORAL DAILY
Status: DISCONTINUED | OUTPATIENT
Start: 2021-02-05 | End: 2021-02-05 | Stop reason: HOSPADM

## 2021-02-05 RX ORDER — LOSARTAN POTASSIUM 25 MG/1
25 TABLET ORAL DAILY
Status: DISCONTINUED | OUTPATIENT
Start: 2021-02-05 | End: 2021-02-05

## 2021-02-05 RX ORDER — GLIPIZIDE 5 MG/1
5 TABLET ORAL
COMMUNITY

## 2021-02-05 RX ORDER — SODIUM CHLORIDE 0.9 % (FLUSH) 0.9 %
10 SYRINGE (ML) INJECTION AS NEEDED
Status: DISCONTINUED | OUTPATIENT
Start: 2021-02-05 | End: 2021-02-05 | Stop reason: HOSPADM

## 2021-02-05 RX ORDER — CHOLECALCIFEROL (VITAMIN D3) 125 MCG
5 CAPSULE ORAL NIGHTLY PRN
Status: DISCONTINUED | OUTPATIENT
Start: 2021-02-05 | End: 2021-02-05 | Stop reason: HOSPADM

## 2021-02-05 RX ORDER — FLUCONAZOLE 100 MG/1
150 TABLET ORAL DAILY
Status: DISCONTINUED | OUTPATIENT
Start: 2021-02-05 | End: 2021-02-05 | Stop reason: HOSPADM

## 2021-02-05 RX ORDER — ATORVASTATIN CALCIUM 10 MG/1
10 TABLET, FILM COATED ORAL DAILY
Status: DISCONTINUED | OUTPATIENT
Start: 2021-02-05 | End: 2021-02-05 | Stop reason: HOSPADM

## 2021-02-05 RX ORDER — ACETAMINOPHEN 325 MG/1
650 TABLET ORAL EVERY 4 HOURS PRN
Status: DISCONTINUED | OUTPATIENT
Start: 2021-02-05 | End: 2021-02-05 | Stop reason: HOSPADM

## 2021-02-05 RX ORDER — DEXTROSE MONOHYDRATE 25 G/50ML
25 INJECTION, SOLUTION INTRAVENOUS
Status: DISCONTINUED | OUTPATIENT
Start: 2021-02-05 | End: 2021-02-05 | Stop reason: HOSPADM

## 2021-02-05 RX ORDER — OXYCODONE HYDROCHLORIDE 5 MG/1
7.5 TABLET ORAL EVERY 4 HOURS PRN
Status: DISCONTINUED | OUTPATIENT
Start: 2021-02-05 | End: 2021-02-05 | Stop reason: HOSPADM

## 2021-02-05 RX ORDER — ACETAMINOPHEN 160 MG/5ML
650 SOLUTION ORAL EVERY 4 HOURS PRN
Status: DISCONTINUED | OUTPATIENT
Start: 2021-02-05 | End: 2021-02-05 | Stop reason: HOSPADM

## 2021-02-05 RX ORDER — DOCUSATE SODIUM 100 MG/1
100 CAPSULE, LIQUID FILLED ORAL 2 TIMES DAILY PRN
Status: DISCONTINUED | OUTPATIENT
Start: 2021-02-05 | End: 2021-02-05 | Stop reason: HOSPADM

## 2021-02-05 RX ORDER — ACETAMINOPHEN 650 MG/1
650 SUPPOSITORY RECTAL EVERY 4 HOURS PRN
Status: DISCONTINUED | OUTPATIENT
Start: 2021-02-05 | End: 2021-02-05 | Stop reason: HOSPADM

## 2021-02-05 RX ORDER — POLYETHYLENE GLYCOL 3350 17 G/17G
17 POWDER, FOR SOLUTION ORAL DAILY
Qty: 24 PACKET | Refills: 0 | Status: SHIPPED | OUTPATIENT
Start: 2021-02-06 | End: 2022-06-01

## 2021-02-05 RX ORDER — INSULIN LISPRO 100 [IU]/ML
0-9 INJECTION, SOLUTION INTRAVENOUS; SUBCUTANEOUS
Status: DISCONTINUED | OUTPATIENT
Start: 2021-02-05 | End: 2021-02-05 | Stop reason: HOSPADM

## 2021-02-05 RX ORDER — SULFAMETHOXAZOLE AND TRIMETHOPRIM 800; 160 MG/1; MG/1
1 TABLET ORAL EVERY 12 HOURS SCHEDULED
Status: DISCONTINUED | OUTPATIENT
Start: 2021-02-05 | End: 2021-02-05 | Stop reason: HOSPADM

## 2021-02-05 RX ORDER — OXYCODONE AND ACETAMINOPHEN 7.5; 325 MG/1; MG/1
1 TABLET ORAL EVERY 8 HOURS PRN
COMMUNITY
End: 2022-06-13 | Stop reason: HOSPADM

## 2021-02-05 RX ORDER — FLUCONAZOLE 150 MG/1
150 TABLET ORAL DAILY
COMMUNITY
Start: 2021-02-05 | End: 2021-02-07

## 2021-02-05 RX ORDER — NICOTINE POLACRILEX 4 MG
15 LOZENGE BUCCAL
Status: DISCONTINUED | OUTPATIENT
Start: 2021-02-05 | End: 2021-02-05 | Stop reason: HOSPADM

## 2021-02-05 RX ORDER — BISACODYL 10 MG
10 SUPPOSITORY, RECTAL RECTAL ONCE
Status: COMPLETED | OUTPATIENT
Start: 2021-02-05 | End: 2021-02-05

## 2021-02-05 RX ORDER — SODIUM CHLORIDE 0.9 % (FLUSH) 0.9 %
10 SYRINGE (ML) INJECTION EVERY 12 HOURS SCHEDULED
Status: DISCONTINUED | OUTPATIENT
Start: 2021-02-05 | End: 2021-02-05 | Stop reason: HOSPADM

## 2021-02-05 RX ORDER — SULFAMETHOXAZOLE AND TRIMETHOPRIM 800; 160 MG/1; MG/1
1 TABLET ORAL 2 TIMES DAILY
COMMUNITY
Start: 2021-02-05 | End: 2021-02-10

## 2021-02-05 RX ORDER — ONDANSETRON 2 MG/ML
4 INJECTION INTRAMUSCULAR; INTRAVENOUS EVERY 6 HOURS PRN
Status: DISCONTINUED | OUTPATIENT
Start: 2021-02-05 | End: 2021-02-05 | Stop reason: HOSPADM

## 2021-02-05 RX ORDER — TAMSULOSIN HYDROCHLORIDE 0.4 MG/1
0.8 CAPSULE ORAL NIGHTLY
Status: DISCONTINUED | OUTPATIENT
Start: 2021-02-05 | End: 2021-02-05 | Stop reason: HOSPADM

## 2021-02-05 RX ORDER — MAGNESIUM SULFATE 1 G/100ML
1 INJECTION INTRAVENOUS AS NEEDED
Status: DISCONTINUED | OUTPATIENT
Start: 2021-02-05 | End: 2021-02-05 | Stop reason: HOSPADM

## 2021-02-05 RX ORDER — HYDROCHLOROTHIAZIDE 25 MG/1
25 TABLET ORAL DAILY
Status: DISCONTINUED | OUTPATIENT
Start: 2021-02-05 | End: 2021-02-05

## 2021-02-05 RX ORDER — ONDANSETRON 4 MG/1
4 TABLET, FILM COATED ORAL EVERY 6 HOURS PRN
Status: DISCONTINUED | OUTPATIENT
Start: 2021-02-05 | End: 2021-02-05 | Stop reason: HOSPADM

## 2021-02-05 RX ORDER — MAGNESIUM SULFATE HEPTAHYDRATE 40 MG/ML
2 INJECTION, SOLUTION INTRAVENOUS AS NEEDED
Status: DISCONTINUED | OUTPATIENT
Start: 2021-02-05 | End: 2021-02-05 | Stop reason: HOSPADM

## 2021-02-05 RX ORDER — INSULIN LISPRO 100 [IU]/ML
0-9 INJECTION, SOLUTION INTRAVENOUS; SUBCUTANEOUS AS NEEDED
Status: DISCONTINUED | OUTPATIENT
Start: 2021-02-05 | End: 2021-02-05 | Stop reason: HOSPADM

## 2021-02-05 RX ORDER — POTASSIUM CHLORIDE 20 MEQ/1
40 TABLET, EXTENDED RELEASE ORAL AS NEEDED
Status: DISCONTINUED | OUTPATIENT
Start: 2021-02-05 | End: 2021-02-05 | Stop reason: HOSPADM

## 2021-02-05 RX ORDER — SODIUM CHLORIDE 9 MG/ML
100 INJECTION, SOLUTION INTRAVENOUS CONTINUOUS
Status: DISPENSED | OUTPATIENT
Start: 2021-02-05 | End: 2021-02-05

## 2021-02-05 RX ORDER — POTASSIUM CHLORIDE 7.45 MG/ML
10 INJECTION INTRAVENOUS
Status: DISCONTINUED | OUTPATIENT
Start: 2021-02-05 | End: 2021-02-05 | Stop reason: HOSPADM

## 2021-02-05 RX ORDER — POTASSIUM CHLORIDE 1.5 G/1.77G
40 POWDER, FOR SOLUTION ORAL AS NEEDED
Status: DISCONTINUED | OUTPATIENT
Start: 2021-02-05 | End: 2021-02-05 | Stop reason: HOSPADM

## 2021-02-05 RX ADMIN — SULFAMETHOXAZOLE AND TRIMETHOPRIM 160 MG: 800; 160 TABLET ORAL at 08:23

## 2021-02-05 RX ADMIN — ATORVASTATIN CALCIUM 10 MG: 10 TABLET, FILM COATED ORAL at 08:23

## 2021-02-05 RX ADMIN — BISACODYL 10 MG: 10 SUPPOSITORY RECTAL at 08:25

## 2021-02-05 RX ADMIN — SODIUM CHLORIDE 100 ML/HR: 9 INJECTION, SOLUTION INTRAVENOUS at 04:49

## 2021-02-05 RX ADMIN — OXYCODONE 7.5 MG: 5 TABLET ORAL at 03:27

## 2021-02-05 RX ADMIN — FLUCONAZOLE 150 MG: 100 TABLET ORAL at 08:23

## 2021-02-05 RX ADMIN — Medication 10 ML: at 08:24

## 2021-02-05 RX ADMIN — INSULIN LISPRO 6 UNITS: 100 INJECTION, SOLUTION INTRAVENOUS; SUBCUTANEOUS at 08:23

## 2021-02-05 RX ADMIN — POLYETHYLENE GLYCOL 3350 17 G: 17 POWDER, FOR SOLUTION ORAL at 08:22

## 2021-02-05 RX ADMIN — ONDANSETRON 4 MG: 2 INJECTION INTRAMUSCULAR; INTRAVENOUS at 03:28

## 2021-02-05 NOTE — CONSULTS
[facility] CONSULT      Patient Identification:  NAME:  Reyna Escalona  Age:  75 y.o.   Sex:  male   :  1945   MRN:  [id number]       Chief complaint/Reason for consult: Nausea vomiting after ESWL    History of present illness:  75 y.o. male had a left ESWL yesterday at [facility].  Came to the ER due to persistent nausea uncontrolled as outpatient at home.  Could not get to pharmacy to get any medications.  Overnight nausea has been better controlled.  He ordered breakfast this morning and is going to try to eat something.  Also complained of severe constipation which is worsening and has not tried anything for.  He denies flank pain      Past medical history:  Past Medical History:   Diagnosis Date   • Diabetes mellitus (CMS/HCC)    • Hyperlipidemia    • Hypertension    • Obesity (BMI 30-39.9) 2021   • Prostate cancer (CMS/HCC)    • Spinal stenosis        Past surgical history:  Past Surgical History:   Procedure Laterality Date   • BACK SURGERY     • CYSTOSCOPY, URETEROSCOPY, RETROGRADE PYELOGRAM, STENT INSERTION Left 10/26/2020    Procedure: CYSTOSCOPY left URETEROSCOPY RETROGRADE PYELOGRAM and STENT INSERTION;  Surgeon: Walker Elaine MD;  Location: [facility] MAIN OR;  Service: Urology;  Laterality: Left;   • HERNIA REPAIR     • JOINT REPLACEMENT     • KIDNEY STONE SURGERY     • ROTATOR CUFF REPAIR     • TUMOR REMOVAL         Allergies:  Erythromycin    Home medications:  Medications Prior to Admission   Medication Sig Dispense Refill Last Dose   • fluconazole (DIFLUCAN) 150 MG tablet Take 150 mg by mouth Daily. 3 days      • gabapentin (NEURONTIN) 100 MG capsule Take 100 mg by mouth 2 (two) times a day.      • glipizide (GLUCOTROL) 5 MG tablet Take 5 mg by mouth 2 (Two) Times a Day Before Meals.      • oxyCODONE-acetaminophen (PERCOCET) 7.5-325 MG per tablet Take 1 tablet by mouth Every 8 (Eight) Hours As Needed.      • sulfamethoxazole-trimethoprim (BACTRIM DS,SEPTRA DS) 800-160 MG per tablet  Take 1 tablet by mouth 2 (Two) Times a Day.      • hydroCHLOROthiazide (HYDRODIURIL) 25 MG tablet Take 25 mg by mouth Daily.      • HYDROcodone-acetaminophen (NORCO) 7.5-325 MG per tablet Take 1-2 tablets by mouth Every 6 (Six) Hours As Needed for Moderate Pain . 15 tablet 0    • losartan (COZAAR) 25 MG tablet Take 25 mg by mouth Daily.      • metFORMIN (GLUCOPHAGE) 1000 MG tablet Take 1,000 mg by mouth 2 (Two) Times a Day With Meals.      • naproxen (NAPROSYN) 500 MG tablet Take 500 mg by mouth 2 (Two) Times a Day As Needed for Mild Pain .      • sildenafil (VIAGRA) 100 MG tablet Take 100 mg by mouth Daily As Needed for Erectile Dysfunction.      • simvastatin (ZOCOR) 20 MG tablet Take 10 mg by mouth Every Night.      • tamsulosin (FLOMAX) 0.4 MG capsule 24 hr capsule Take 2 capsules by mouth Every Night.           Hospital medications:  atorvastatin, 10 mg, Oral, Daily  fluconazole, 150 mg, Oral, Daily  insulin lispro, 0-9 Units, Subcutaneous, TID AC  sodium chloride, 10 mL, Intravenous, Q12H  sulfamethoxazole-trimethoprim, 1 tablet, Oral, Q12H  tamsulosin, 0.8 mg, Oral, Nightly      sodium chloride, 100 mL/hr, Last Rate: 100 mL/hr (02/05/21 0709)      •  acetaminophen **OR** acetaminophen **OR** acetaminophen  •  dextrose  •  dextrose  •  docusate sodium  •  glucagon (human recombinant)  •  insulin lispro **AND** insulin lispro  •  magnesium hydroxide  •  magnesium sulfate **OR** magnesium sulfate in D5W 1g/100mL (PREMIX)  •  melatonin  •  ondansetron **OR** ondansetron  •  oxyCODONE  •  potassium chloride **OR** potassium chloride **OR** potassium chloride  •  [COMPLETED] Insert peripheral IV **AND** sodium chloride  •  sodium chloride    Family history:  Family History   Problem Relation Age of Onset   • Cancer Mother    • Nephrolithiasis Father        Social history:  Social History     Tobacco Use   • Smoking status: Current Some Day Smoker     Types: Cigars   • Smokeless tobacco: Never Used   • Tobacco  comment: occcasional   Substance Use Topics   • Alcohol use: Yes     Frequency: Monthly or less     Drinks per session: 1 or 2     Binge frequency: Never   • Drug use: No       REVIEW OF SYSTEMS:  Constitutional - Negative for  fevers, chills, letthargy  Eyes/Ears/Nose/Mouth/Throat - Negative for  changes in vision or hearing  Cardiovascular - Negative for  chest pain,  dysrhythmia  Respiratory - Negative for  dyspnea or cough  Gastrointestinal -positive*nausea or vomiting  Genitourinary - Negative for  dysuria or hematuria  Hematologic/Lymphatic - Negative for bruising or edema  Skin - Negative for  erythema or rash  Psych - Negative     Objective:  TMax 24 hours:   Temp (24hrs), Av.3 °F (36.8 °C), Min:98.2 °F (36.8 °C), Max:98.4 °F (36.9 °C)      Vitals Ranges:   Temp:  [98.2 °F (36.8 °C)-98.4 °F (36.9 °C)] 98.4 °F (36.9 °C)  Heart Rate:  [69-91] 79  Resp:  [16-20] 20  BP: (161-203)/(78-94) 161/78    Intake/Output Last 3 shifts:  No intake/output data recorded.     Physical Exam:    General Appearance:    Alert, cooperative, NAD   HEENT:    No trauma, pupils reactive, hearing intact   Back:     No CVA tenderness, no masses   Lungs:     Respirations unlabored, no wheezing    Heart:    No cyanosis, No significant edema   Abdomen:     Soft, ND   :    No suprapubic distention or tenderness to palpation    Extremities:   No edema, no deformity   Lymphatic:   No neck or groin LAD   Skin:   No bleeding, bruising or rashes   Neuro/Psych:   Orientation intact, mood/affect pleasant, no focal findings       Results review:   I reviewed the patient's new clinical results.    Data review:  Lab Results (last 24 hours)     Procedure Component Value Units Date/Time    POC Glucose Once [[id number]]  (Abnormal) Collected: 21    Specimen: Blood Updated: 21     Glucose 250 mg/dL      Comment: Serial Number: [id number]:  [id number]       COVID PRE-OP / PRE-PROCEDURE SCREENING ORDER (NO ISOLATION) -  Swab, Nasopharynx [[id number]] Collected: 02/05/21 0124    Specimen: Swab from Nasopharynx Updated: 02/05/21 0129    Narrative:      The following orders were created for panel order COVID PRE-OP / PRE-PROCEDURE SCREENING ORDER (NO ISOLATION) - Swab, Nasopharynx.  Procedure                               Abnormality         Status                     ---------                               -----------         ------                     COVID-19,APTIMA PANTHER,...[[id number]]                      In process                   Please view results for these tests on the individual orders.    COVID-19,APTIMA PANTHER,[facility], NP/OP SWAB IN UTM/VTM/SALINE TRANSPORT MEDIA,24 HR TAT - Swab, Nasopharynx [[id number]] Collected: 02/05/21 0124    Specimen: Swab from Nasopharynx Updated: 02/05/21 0129    POC Lactate [[id number]]  (Normal) Collected: 02/04/21 2242    Specimen: Blood Updated: 02/04/21 2243     Lactate 1.3 mmol/L      Comment: Serial Number: [id number]:  [id number]       Manual Differential [[id number]]  (Abnormal) Collected: 02/04/21 2105    Specimen: Blood Updated: 02/04/21 2156     Neutrophil % 93.0 %      Lymphocyte % 3.0 %      Monocyte % 3.0 %      Atypical Lymphocyte % 1.0 %      Neutrophils Absolute 11.81 10*3/mm3      Lymphocytes Absolute 0.38 10*3/mm3      Monocytes Absolute 0.38 10*3/mm3      RBC Morphology Normal     WBC Morphology Normal     Platelet Estimate Adequate     Large Platelets Slight/1+    CBC & Differential [[id number]]  (Abnormal) Collected: 02/04/21 2105    Specimen: Blood Updated: 02/04/21 2156    Narrative:      The following orders were created for panel order CBC & Differential.  Procedure                               Abnormality         Status                     ---------                               -----------         ------                     Scan Slide[[id number]]                                       Final result               CBC Auto Differential[[id number]]         Abnormal            Final result                 Please view results for these tests on the individual orders.    CBC Auto Differential [[id number]]  (Abnormal) Collected: 02/04/21 2105    Specimen: Blood Updated: 02/04/21 2156     WBC 12.70 10*3/mm3      RBC 4.83 10*6/mm3      Hemoglobin 14.3 g/dL      Hematocrit 41.2 %      MCV 85.2 fL      MCH 29.6 pg      MCHC 34.7 g/dL      RDW 14.7 %      RDW-SD 43.8 fl      MPV 9.0 fL      Platelets 211 10*3/mm3     Narrative:      The previously reported component NRBC is no longer being reported. Previous result was 0.0 /100 WBC (Reference Range: 0.0-0.2 /100 WBC) on 2/4/2021 at 2120 EST.    Scan Slide [[id number]] Collected: 02/04/21 2105    Specimen: Blood Updated: 02/04/21 2156     Scan Slide --     Comment: See Manual Differential Results       Comprehensive Metabolic Panel [[id number]]  (Abnormal) Collected: 02/04/21 2105    Specimen: Blood Updated: 02/04/21 2144     Glucose 303 mg/dL      BUN 22 mg/dL      Creatinine 1.54 mg/dL      Sodium 133 mmol/L      Potassium 4.6 mmol/L      Comment: Slight hemolysis detected by analyzer. Results may be affected.        Chloride 96 mmol/L      CO2 27.0 mmol/L      Calcium 9.7 mg/dL      Total Protein 7.5 g/dL      Albumin 4.70 g/dL      ALT (SGPT) 17 U/L      AST (SGOT) 18 U/L      Alkaline Phosphatase 68 U/L      Total Bilirubin 0.4 mg/dL      eGFR Non African Amer 44 mL/min/1.73      Globulin 2.8 gm/dL      A/G Ratio 1.7 g/dL      BUN/Creatinine Ratio 14.3     Anion Gap 10.0 mmol/L     Narrative:      GFR Normal >60  Chronic Kidney Disease <60  Kidney Failure <15      Lipase [[id number]]  (Abnormal) Collected: 02/04/21 2105    Specimen: Blood Updated: 02/04/21 2144     Lipase 116 U/L     Urinalysis With Culture If Indicated - Urine, Clean Catch [[id number]]  (Abnormal) Collected: 02/04/21 2106    Specimen: Urine, Clean Catch Updated: 02/04/21 2119     Color, UA Orange     Comment: Result checked         Appearance, UA Cloudy      Comment: Result checked         pH, UA 6.0     Specific Gravity, UA 1.021     Glucose, UA >=1000 mg/dL (3+)     Ketones, UA 15 mg/dL (1+)     Bilirubin, UA Negative     Blood, UA Large (3+)     Protein,  mg/dL (2+)     Leuk Esterase, UA Negative     Nitrite, UA Positive     Urobilinogen, UA 1.0 E.U./dL    Urinalysis, Microscopic Only - Urine, Clean Catch [[id number]]  (Abnormal) Collected: 02/04/21 2106    Specimen: Urine, Clean Catch Updated: 02/04/21 2119     RBC, UA Too Numerous to Count /HPF      WBC, UA 3-5 /HPF      Bacteria, UA None Seen /HPF      Squamous Epithelial Cells, UA None Seen /HPF      Hyaline Casts, UA None Seen /LPF      Methodology Automated Microscopy           Imaging:  Imaging Results (Last 24 Hours)     ** No results found for the last 24 hours. **             Assessment:       PONV (postoperative nausea and vomiting)    Diabetes mellitus (CMS/HCC)    Hypertension    Hyperlipidemia    Prostate cancer (CMS/HCC)    JAY (acute kidney injury) (CMS/HCC)    Obesity (BMI 30-39.9)    Postoperative nausea and vomiting after ESWL  Constipation    Plan:     Patient without flank pain, feeling better this morning  Ordered breakfast, if tolerates breakfast well plan for discharge later today  Recommend stool softener and MiraLAX for constipation  Follow-up outpatient with Dr. Elaine as scheduled    Ger Blackwell MD  [facility]  [address], [address]  [place] [address]  [phone]  02/05/21  07:55 EST

## 2021-02-05 NOTE — DISCHARGE SUMMARY
[facility]  DISCHARGE SUMMARY        Prepared For PCP:  Provider, No Known    Patient Name: Reyna Escalona  : 1945  MRN: [id number]      Date of Admission:   2021    Date of Discharge:  2021    Length of stay:  LOS: 0 days     Hospital Course     Presenting Problem:   Nausea [R11.0]  Flank pain [R10.9]      Active Hospital Problems    Diagnosis  POA   • **PONV (postoperative nausea and vomiting) [R11.2, Z98.890]  Yes   • JAY (acute kidney injury) (CMS/HCC) [N17.9]  Yes   • Obesity (BMI 30-39.9) [E66.9]  Yes   • Diabetes mellitus (CMS/HCC) [E11.9]  Yes   • Hypertension [I10]  Yes   • Hyperlipidemia [E78.5]  Yes   • Prostate cancer (CMS/HCC) [C61]  Yes      Resolved Hospital Problems   No resolved problems to display.           Hospital Course:  Reyna Escalona is a 75 y.o. male who presented to [facility] ED on 2021 with a history of prostate cancer, diabetes mellitus type 2, hypertension, hyperlipidemia, and spinal stenosis complaining of intractable vomiting.  is s/p prostate procedure 2021 with Dr. Elaine but became ill when eating after procedure.  Patient was admitted for urology evaluation.  Leukocytosis and UA abnormal 2/2 recent procedure.  Patient tolerated breakfast on 2021 and was cleared for discharge home.  Started on bowel regimen for constipation.  He is in agreement with discharge instructions.          Discharge Instructions:       - Follow up with PCP in 5-7 days  - Follow up with Dr. Elaine as scheduled  - Take Colace 100mg as needed for constipation twice a day  - Take Miralax 17g packet daily for constipation  - Patient tolerating diet            Day of Discharge     HPI:   Patient asymptomatic, tolerated breakfast. Cleared by urology.  Eager for discharge home.    Vital Signs:   Temp:  [98.2 °F (36.8 °C)-98.4 °F (36.9 °C)] 98.4 °F (36.9 °C)  Heart Rate:  [69-91] 79  Resp:  [16-20] 20  BP: (161-203)/(74-94) 166/74     Physical  Exam:  Physical Exam  GENERAL: The patient is well developed and nontoxic. obese  HEENT: Nonicteric sclerae, PERRLA, EOMI. Oropharynx clear. Moist mucous membranes. Conjunctivae appear well perfused.  CHEST: Chest wall is nontender.  HEART: Regular rate and rhythm without murmurs. No MRG  LUNGS: Clear to auscultation bilaterally. No WRR  ABDOMEN: Soft, positive bowel sounds, nontender, no organomegaly.  RECTAL: Deferred.  SKIN: No rash, no excessive bruising, petechiae, or purpura.  NEUROLOGIC: Cranial nerves II-XII intact without motor/sensory deficit    Pertinent  and/or Most Recent Results     Results from last 7 days   Lab Units 02/05/21  0722 02/04/21  2105   WBC 10*3/mm3 9.80 12.70*   HEMOGLOBIN g/dL 12.8* 14.3   HEMATOCRIT % 38.1 41.2   PLATELETS 10*3/mm3 225 211   SODIUM mmol/L 132* 133*   POTASSIUM mmol/L 4.1 4.6   CHLORIDE mmol/L 98 96*   CO2 mmol/L 23.0 27.0   BUN mg/dL 19 22   CREATININE mg/dL 1.50* 1.54*   GLUCOSE mg/dL 228* 303*   CALCIUM mg/dL 9.1 9.7     Results from last 7 days   Lab Units 02/04/21  2105   BILIRUBIN mg/dL 0.4   ALK PHOS U/L 68   ALT (SGPT) U/L 17   AST (SGOT) U/L 18           Invalid input(s): TG, LDLCALC, LDLREALC  Results from last 7 days   Lab Units 02/04/21  2242   LACTATE mmol/L 1.3       Brief Urine Lab Results  (Last result in the past 365 days)      Color   Clarity   Blood   Leuk Est   Nitrite   Protein   CREAT   Urine HCG        02/04/21 2106 [place]  Comment:  Result checked  Cloudy  Comment:  Result checked  Large (3+) Negative Positive 100 mg/dL (2+)               Microbiology Results Abnormal     None                                      Test Results Pending at Discharge  Pending Labs     Order Current Status    COVID PRE-OP / PRE-PROCEDURE SCREENING ORDER (NO ISOLATION) - Swab, Nasopharynx In process    COVID-19,APTIMA PANTHER,[facility], NP/OP SWAB IN UTM/VTM/SALINE TRANSPORT MEDIA,24 HR TAT - Swab, Nasopharynx In process    Hemoglobin A1c In process             Procedures Performed           Consults:   Consults     Date and Time Order Name Status Description    2/4/2021 7156 Urology (on-call MD unless specified) Completed     2/4/2021 2354 Hospitalist (on-call MD unless specified) Completed             Discharge Details        Discharge Medications      New Medications      Instructions Start Date   docusate sodium 100 MG capsule   100 mg, Oral, 2 Times Daily PRN      polyethylene glycol 17 g packet  Commonly known as: MIRALAX   17 g, Oral, Daily   Start Date: February 6, 2021        Continue These Medications      Instructions Start Date   fluconazole 150 MG tablet  Commonly known as: DIFLUCAN   150 mg, Oral, Daily, 3 days       gabapentin 100 MG capsule  Commonly known as: NEURONTIN   100 mg, Oral, 2 times daily      glipizide 5 MG tablet  Commonly known as: GLUCOTROL   5 mg, Oral, 2 Times Daily Before Meals      hydroCHLOROthiazide 25 MG tablet  Commonly known as: HYDRODIURIL   25 mg, Oral, Daily      HYDROcodone-acetaminophen 7.5-325 MG per tablet  Commonly known as: NORCO   1-2 tablets, Oral, Every 6 Hours PRN      losartan 25 MG tablet  Commonly known as: COZAAR   25 mg, Oral, Daily      metFORMIN 1000 MG tablet  Commonly known as: GLUCOPHAGE   1,000 mg, Oral, 2 Times Daily With Meals      naproxen 500 MG tablet  Commonly known as: NAPROSYN   500 mg, Oral, 2 Times Daily PRN      oxyCODONE-acetaminophen 7.5-325 MG per tablet  Commonly known as: PERCOCET   1 tablet, Oral, Every 8 Hours PRN      sildenafil 100 MG tablet  Commonly known as: VIAGRA   100 mg, Oral, Daily PRN      simvastatin 20 MG tablet  Commonly known as: ZOCOR   10 mg, Oral, Nightly      sulfamethoxazole-trimethoprim 800-160 MG per tablet  Commonly known as: BACTRIM DS,SEPTRA DS   1 tablet, Oral, 2 Times Daily      tamsulosin 0.4 MG capsule 24 hr capsule  Commonly known as: FLOMAX   2 capsules, Oral, Nightly             Allergies   Allergen Reactions   • Erythromycin GI Intolerance          Discharge Disposition:  Home or Self Care    Diet:  Hospital:  Diet Order   Procedures   • Diet Cardiac, Diabetic/Consistent Carbs; Healthy Heart; Diabetic - Consistent Carb         Discharge Activity:   Activity Instructions     Activity as Tolerated              CODE STATUS:    Code Status and Medical Interventions:   Ordered at: 02/05/21 0256     Code Status:    CPR     Medical Interventions (Level of Support Prior to Arrest):    Full         Follow-up Appointments  No future appointments.    Additional Instructions for the Follow-ups that You Need to Schedule     Call MD With Problems / Concerns   As directed      Instructions: Call [phone] or email [email or web address] for problems or concerns.    Order Comments: Instructions: Call [phone] or email [email or web address] for problems or concerns.          Discharge Follow-up with PCP   As directed       Currently Documented PCP:    Provider, No Known    PCP Phone Number:    None     Follow Up Details: Follow up with PCP in 5-7 days         Discharge Follow-up with Specified Provider: Dr. Elaine; 1 Week   As directed      To: Dr. Elaine    Follow Up: 1 Week    Follow Up Details: Follow up with Dr. Elaine as scheduled                 Condition on Discharge:      Stable      This patient has been examined wearing appropriate Personal Protective Equipment and discussed with Patient. 02/05/21      Electronically signed by Omar Greenwood DO, 02/05/21, 11:16 AM EST.      Time: I spent  20  minutes on this discharge activity which included face-to-face encounter with the patient/reviewing the data in the system/coordination of the care with the nursing staff as well as consultants/documentation/entering orders.

## 2021-02-05 NOTE — H&P
[facility] Medicine Services      Patient Name: Reyna Escalona  : 1945  MRN: [id number]  Primary Care Physician: Provider, No Known  Date of admission: 2021    Patient Care Team:  Provider, No Known as PCP - General          Subjective   History Present Illness     Chief Complaint:   Chief Complaint   Patient presents with   • Vomiting       Mr. Escalona is a 75 y.o. male who presents to [facility] ED with a history of prostate cancer, diabetes mellitus type 2, hypertension, hyperlipidemia, and spinal stenosis complaining of intractable vomiting.  Patient states he had a prostate procedure 2021 with Dr. Elaine.  He states after the procedure he was feeling well and had a big lunch.  He states as the day wore on he began to feel more and more ill.  He states he had broth for dinner and shortly thereafter he began to vomit.  Patient states he was not prescribed any antiemetics post procedure.  Patient denies hematic emesis, hematochezia, and melena.  However he does report hematuria and lower abdominal pain.  Of note, patient states he has urinary frequency and urgency but this is not new for him.  Patient is hard of hearing.    In the ED, glucose 303, sodium 133, BUN 22, creatinine 1.54, lipase 116, WBCs 12.70.  UA shows 3+ blood, 3+ glucose, 1+ ketones, and positive nitrites.  EKG shows sinus rhythm with a rate of 85.  Urology consulted per ED.  Patient admitted for further evaluation and treatment.        Review of Systems   Constitution: Negative for chills and fever.   Cardiovascular: Negative for chest pain.   Respiratory: Negative for shortness of breath.    Gastrointestinal: Positive for abdominal pain, constipation, nausea and vomiting. Negative for hematemesis, hematochezia and melena.   Genitourinary: Positive for frequency, hematuria and urgency. Negative for dysuria.   All other systems reviewed and are negative.        Personal History     Past Medical  History:   Past Medical History:   Diagnosis Date   • Diabetes mellitus (CMS/HCC)    • Hyperlipidemia    • Hypertension    • Obesity (BMI 30-39.9) 2/5/2021   • Prostate cancer (CMS/HCC)    • Spinal stenosis        Surgical History:      Past Surgical History:   Procedure Laterality Date   • BACK SURGERY     • CYSTOSCOPY, URETEROSCOPY, RETROGRADE PYELOGRAM, STENT INSERTION Left 10/26/2020    Procedure: CYSTOSCOPY left URETEROSCOPY RETROGRADE PYELOGRAM and STENT INSERTION;  Surgeon: Walker Elaine MD;  Location: [facility];  Service: Urology;  Laterality: Left;   • HERNIA REPAIR     • JOINT REPLACEMENT     • KIDNEY STONE SURGERY     • ROTATOR CUFF REPAIR     • TUMOR REMOVAL         Family History: family history includes Cancer in his mother; Nephrolithiasis in his father. Otherwise pertinent FHx was reviewed and unremarkable.     Social History:  reports that he has been smoking cigars. He has never used smokeless tobacco. He reports current alcohol use. He reports that he does not use drugs.      Medications:  Prior to Admission medications    Medication Sig Start Date End Date Taking? Authorizing Provider   fluconazole (DIFLUCAN) 150 MG tablet Take 150 mg by mouth Daily. 3 days 2/5/21 2/7/21 Yes Jesus Alberto Jaimes MD   gabapentin (NEURONTIN) 100 MG capsule Take 100 mg by mouth 2 (two) times a day.   Yes Jesus Alberto Jaimes MD   glipizide (GLUCOTROL) 5 MG tablet Take 5 mg by mouth 2 (Two) Times a Day Before Meals.   Yes Jesus Alberto Jaimes MD   oxyCODONE-acetaminophen (PERCOCET) 7.5-325 MG per tablet Take 1 tablet by mouth Every 8 (Eight) Hours As Needed.   Yes Jesus Alberto Jaimes MD   sulfamethoxazole-trimethoprim (BACTRIM DS,SEPTRA DS) 800-160 MG per tablet Take 1 tablet by mouth 2 (Two) Times a Day. 2/5/21 2/10/21 Yes Jesus Alberto Jaimes MD   hydroCHLOROthiazide (HYDRODIURIL) 25 MG tablet Take 25 mg by mouth Daily.    Jesus Alberto Jaimes MD   HYDROcodone-acetaminophen (NORCO) 7.5-325 MG per  tablet Take 1-2 tablets by mouth Every 6 (Six) Hours As Needed for Moderate Pain . 10/8/20   Nathaniel Colunga MD   losartan (COZAAR) 25 MG tablet Take 25 mg by mouth Daily.    ProviderJesus Alberto MD   metFORMIN (GLUCOPHAGE) 1000 MG tablet Take 1,000 mg by mouth 2 (Two) Times a Day With Meals. 2/19/14   Emergency, Nurse Epic, RN   naproxen (NAPROSYN) 500 MG tablet Take 500 mg by mouth 2 (Two) Times a Day As Needed for Mild Pain .    Jesus Alberto Jaimes MD   sildenafil (VIAGRA) 100 MG tablet Take 100 mg by mouth Daily As Needed for Erectile Dysfunction.    Jesus Alberto Jaimes MD   simvastatin (ZOCOR) 20 MG tablet Take 10 mg by mouth Every Night.    Jesus Alberto Jaimes MD   tamsulosin (FLOMAX) 0.4 MG capsule 24 hr capsule Take 2 capsules by mouth Every Night.    Jesus Alberto aJimes MD   clotrimazole-betamethasone (LOTRISONE) 1-0.05 % cream Apply  topically to the appropriate area as directed 2 (Two) Times a Day. 11/1/20 2/5/21  Chiqui Rogers APRN   fluconazole (DIFLUCAN) 150 MG tablet Take 1 today and take 1 Wednesday 11/1/20 2/5/21  Chiqui Rogers APRN       Allergies:    Allergies   Allergen Reactions   • Erythromycin GI Intolerance       Objective   Objective     Vital Signs  Temp:  [98.2 °F (36.8 °C)-98.4 °F (36.9 °C)] 98.4 °F (36.9 °C)  Heart Rate:  [69-91] 71  Resp:  [16-20] 18  BP: (173-203)/(81-94) 173/94  SpO2:  [94 %-98 %] 97 %  on   ;   Device (Oxygen Therapy): room air  Body mass index is 35.48 kg/m².    Physical Exam  Vitals signs reviewed.   Constitutional:       Appearance: Normal appearance. He is obese.   HENT:      Head: Normocephalic and atraumatic.      Ears:      Comments: Hard of hearing     Nose: Nose normal.      Mouth/Throat:      Mouth: Mucous membranes are moist.      Pharynx: Oropharynx is clear.   Eyes:      Conjunctiva/sclera: Conjunctivae normal.      Pupils: Pupils are equal, round, and reactive to light.   Neck:      Musculoskeletal: Normal range of motion.   Cardiovascular:       Rate and Rhythm: Normal rate and regular rhythm.      Pulses: Normal pulses.      Heart sounds: Normal heart sounds.   Pulmonary:      Effort: Pulmonary effort is normal.      Breath sounds: Normal breath sounds.   Abdominal:      General: Bowel sounds are normal. There is no distension.      Palpations: Abdomen is soft.      Tenderness: There is abdominal tenderness.   Musculoskeletal: Normal range of motion.   Skin:     General: Skin is warm and dry.      Capillary Refill: Capillary refill takes less than 2 seconds.   Neurological:      General: No focal deficit present.      Mental Status: He is alert and oriented to person, place, and time.   Psychiatric:         Mood and Affect: Mood normal.         Behavior: Behavior normal.         Thought Content: Thought content normal.         Judgment: Judgment normal.           Results Review:  I have personally reviewed most recent cardiac tracings, lab results and microbiology results and agree with findings, most notably: HPI information.    Results from last 7 days   Lab Units 02/04/21  2105   WBC 10*3/mm3 12.70*   HEMOGLOBIN g/dL 14.3   HEMATOCRIT % 41.2   PLATELETS 10*3/mm3 211     Results from last 7 days   Lab Units 02/04/21  2242 02/04/21 2105   SODIUM mmol/L  --  133*   POTASSIUM mmol/L  --  4.6   CHLORIDE mmol/L  --  96*   CO2 mmol/L  --  27.0   BUN mg/dL  --  22   CREATININE mg/dL  --  1.54*   GLUCOSE mg/dL  --  303*   CALCIUM mg/dL  --  9.7   ALT (SGPT) U/L  --  17   AST (SGOT) U/L  --  18   LACTATE mmol/L 1.3  --      Estimated Creatinine Clearance: 53.5 mL/min (A) (by C-G formula based on SCr of 1.54 mg/dL (H)).  Brief Urine Lab Results  (Last result in the past 365 days)      Color   Clarity   Blood   Leuk Est   Nitrite   Protein   CREAT   Urine HCG        02/04/21 2106 [place]  Comment:  Result checked  Cloudy  Comment:  Result checked  Large (3+) Negative Positive 100 mg/dL (2+)               Microbiology Results (last 10 days)     ** No results  found for the last 240 hours. **          ECG/EMG Results (most recent)     None              No radiology results for the last 7 days      Estimated Creatinine Clearance: 53.5 mL/min (A) (by C-G formula based on SCr of 1.54 mg/dL (H)).    Assessment/Plan   Assessment/Plan       Active Hospital Problems    Diagnosis  POA   • **PONV (postoperative nausea and vomiting) [R11.2, Z98.890]  Yes     Priority: High   • JAY (acute kidney injury) (CMS/[facility]) [N17.9]  Yes     Priority: High   • Obesity (BMI 30-39.9) [E66.9]  Yes   • Diabetes mellitus (CMS/[facility]) [E11.9]  Yes   • Hypertension [I10]  Yes   • Hyperlipidemia [E78.5]  Yes   • Prostate cancer (CMS/[facility]) [C61]  Yes      Resolved Hospital Problems   No resolved problems to display.     Postop nausea and vomiting    --Dilaudid, Zofran, 1 L normal saline bolus given in ED  -As needed Zofran  -Continue postop oxycodone, Diflucan, and Bactrim  -Consider switching Bactrim to IV abx if unable to keep down  -Urology consulted per ED    Kidney injury, acute    --Likely prerenal  -BUN 22, Creatinine 1.54  -UA reviewed  -Normal saline at 100 cc an hour x 8 hours  -Daily BMP  -Avoid nephrotoxic medications  -Renally dose medications  -Monitor urine output  -Consider Nephrology consult if creatinine fails to improve    Essential Hypertension, Chronic, Controlled  -Hold HCTZ and Cozaar for now  - Monitor with routine vital signs     Hyperlipidemia  -Continue statin    Diabetes mellitus type 2, chronic  -Glucose 303  -A1C  -Hold PO diabetes medications  -Start SSI  -Monitor glucose AC/HS    Prostate cancer  -Continue tamsulosin    Obesity  -encourage lifestyle modifications      VTE Prophylaxis -   Mechanical Order History:      Ordered        02/05/21 0256  Place Sequential Compression Device  Once         02/05/21 0256  Maintain Sequential Compression Device  Continuous                 Pharmalogical Order History:     None          CODE STATUS:    Code Status and Medical  Interventions:   Ordered at: 02/05/21 0256     Code Status:    CPR     Medical Interventions (Level of Support Prior to Arrest):    Full       This patient has been examined wearing appropriate Personal Protective Equipment. 02/05/21      I discussed the patient's findings and my recommendations with patient.      Signature:Electronically signed by PEDRO Packer, 02/05/21, 3:52 AM VARSHA.      [facility] Hospitalist Team

## 2021-02-05 NOTE — PLAN OF CARE
Goal Outcome Evaluation:  Plan of Care Reviewed With: patient  Progress: no change  Outcome Summary: Patient new admit from the ER with c/o nausea and pain. Will have urology consuslt today. Vitals stbale at this time, on room air. Will continue to monitor.

## 2021-02-05 NOTE — ED PROVIDER NOTES
Subjective   Patient is a 75-year-old male presents emergency department with complaint of nausea, vomiting and flank pain post lithotripsy this morning.  Patient reports he had lithotripsy at [facility] this morning for multiple right-sided kidney stones.  He reports he was discharged home, and he did take his pain medication, and had 3 episodes of vomiting, after multiple attempts to hydrate orally with broth, and water.  He states he was unable to keep his pain medicine down, and feels that is why his pain has not improved.  He denies any urinary symptoms at this time.  No hematemesis or hematochezia.  No coffee-ground emesis or melena.  No fever or chills.  Last bowel movement was 2 days ago.  His procedure was performed by Dr. Elaine this morning.  He states that this was around 9:30 AM.  Has not been diagnosed with COVID-19 in the past.          Review of Systems   Constitutional: Negative for chills and fever.   Respiratory: Positive for chest tightness. Negative for cough and shortness of breath.    Cardiovascular: Negative for chest pain, palpitations and leg swelling.   Gastrointestinal: Positive for nausea and vomiting. Negative for abdominal pain and diarrhea.   Genitourinary: Positive for flank pain (Right-sided). Negative for decreased urine volume, difficulty urinating, discharge, dysuria, enuresis, frequency, genital sores, hematuria, penile pain, penile swelling, scrotal swelling, testicular pain and urgency.   Musculoskeletal: Positive for back pain (Right lower back).   Skin: Negative for color change, rash and wound.   Neurological: Negative for dizziness, syncope, weakness and light-headedness.       Past Medical History:   Diagnosis Date   • Diabetes mellitus (CMS/[facility])    • Hyperlipidemia    • Hypertension    • Obesity (BMI 30-39.9) 2/5/2021   • Prostate cancer (CMS/[facility])    • Spinal stenosis        Allergies   Allergen Reactions   • Erythromycin GI Intolerance       Past Surgical History:   Procedure  Laterality Date   • BACK SURGERY     • CYSTOSCOPY, URETEROSCOPY, RETROGRADE PYELOGRAM, STENT INSERTION Left 10/26/2020    Procedure: CYSTOSCOPY left URETEROSCOPY RETROGRADE PYELOGRAM and STENT INSERTION;  Surgeon: Walker Elaine MD;  Location: [facility] MAIN OR;  Service: Urology;  Laterality: Left;   • HERNIA REPAIR     • JOINT REPLACEMENT     • KIDNEY STONE SURGERY     • ROTATOR CUFF REPAIR     • TUMOR REMOVAL         Family History   Problem Relation Age of Onset   • Cancer Mother    • Nephrolithiasis Father        Social History     Socioeconomic History   • Marital status:      Spouse name: Not on file   • Number of children: Not on file   • Years of education: Not on file   • Highest education level: Not on file   Tobacco Use   • Smoking status: Current Some Day Smoker     Types: Cigars   • Smokeless tobacco: Never Used   • Tobacco comment: occcasional   Substance and Sexual Activity   • Alcohol use: Yes     Frequency: Monthly or less     Drinks per session: 1 or 2     Binge frequency: Never   • Drug use: No   • Sexual activity: Defer           Objective   Physical Exam  Vitals signs and nursing note reviewed.   Constitutional:       General: He is not in acute distress.     Appearance: Normal appearance. He is not ill-appearing, toxic-appearing or diaphoretic.   HENT:      Head: Normocephalic and atraumatic.      Nose: Nose normal.      Mouth/Throat:      Mouth: Mucous membranes are moist.      Pharynx: Oropharynx is clear.   Eyes:      Extraocular Movements: Extraocular movements intact.      Conjunctiva/sclera: Conjunctivae normal.      Pupils: Pupils are equal, round, and reactive to light.   Neck:      Musculoskeletal: Normal range of motion and neck supple.   Cardiovascular:      Rate and Rhythm: Normal rate and regular rhythm.      Heart sounds: Normal heart sounds. No murmur. No friction rub. No gallop.    Pulmonary:      Effort: Pulmonary effort is normal.      Breath sounds: Normal breath  "sounds.   Abdominal:      General: Bowel sounds are normal. There is no distension.      Palpations: Abdomen is soft.      Tenderness: There is no abdominal tenderness. There is right CVA tenderness (Mild). There is no left CVA tenderness, guarding or rebound.      Comments: No active emesis   Musculoskeletal: Normal range of motion.   Skin:     General: Skin is warm and dry.      Capillary Refill: Capillary refill takes less than 2 seconds.      Findings: No erythema or rash.   Neurological:      Mental Status: He is alert and oriented to person, place, and time.   Psychiatric:         Mood and Affect: Mood normal.         Behavior: Behavior normal.         Procedures           ED Course  /89 (BP Location: Right arm, Patient Position: Sitting)   Pulse 87   Temp 98 °F (36.7 °C) (Oral)   Resp 20   Ht 180.3 cm (71\")   Wt 115 kg (254 lb 6.6 oz)   SpO2 96%   BMI 35.48 kg/m²   Labs Reviewed   COMPREHENSIVE METABOLIC PANEL - Abnormal; Notable for the following components:       Result Value    Glucose 303 (*)     Creatinine 1.54 (*)     Sodium 133 (*)     Chloride 96 (*)     eGFR Non  Amer 44 (*)     All other components within normal limits    Narrative:     GFR Normal >60  Chronic Kidney Disease <60  Kidney Failure <15     URINALYSIS W/ CULTURE IF INDICATED - Abnormal; Notable for the following components:    Color, UA Orange (*)     Appearance, UA Cloudy (*)     Glucose, UA >=1000 mg/dL (3+) (*)     Ketones, UA 15 mg/dL (1+) (*)     Blood, UA Large (3+) (*)     Protein,  mg/dL (2+) (*)     Nitrite, UA Positive (*)     All other components within normal limits   LIPASE - Abnormal; Notable for the following components:    Lipase 116 (*)     All other components within normal limits   CBC WITH AUTO DIFFERENTIAL - Abnormal; Notable for the following components:    WBC 12.70 (*)     All other components within normal limits    Narrative:     The previously reported component NRBC is no longer being " reported. Previous result was 0.0 /100 WBC (Reference Range: 0.0-0.2 /100 WBC) on 2/4/2021 at 2120 EST.   URINALYSIS, MICROSCOPIC ONLY - Abnormal; Notable for the following components:    RBC, UA Too Numerous to Count (*)     WBC, UA 3-5 (*)     All other components within normal limits   MANUAL DIFFERENTIAL - Abnormal; Notable for the following components:    Neutrophil % 93.0 (*)     Lymphocyte % 3.0 (*)     Monocyte % 3.0 (*)     Neutrophils Absolute 11.81 (*)     Lymphocytes Absolute 0.38 (*)     All other components within normal limits   BASIC METABOLIC PANEL - Abnormal; Notable for the following components:    Glucose 228 (*)     Creatinine 1.50 (*)     Sodium 132 (*)     eGFR Non  Amer 46 (*)     All other components within normal limits    Narrative:     GFR Normal >60  Chronic Kidney Disease <60  Kidney Failure <15     CBC WITH AUTO DIFFERENTIAL - Abnormal; Notable for the following components:    Hemoglobin 12.8 (*)     Neutrophil % 79.6 (*)     Lymphocyte % 9.7 (*)     Neutrophils, Absolute 7.80 (*)     Monocytes, Absolute 1.00 (*)     All other components within normal limits   HEMOGLOBIN A1C - Abnormal; Notable for the following components:    Hemoglobin A1C 8.0 (*)     All other components within normal limits    Narrative:     Hemoglobin A1C Reference Range:    <5.7 %        Normal  5.7-6.4 %     Increased risk for diabetes  > 6.4 %        Diabetes       These guidelines have been recommended by the American Diabetic Association for Hgb A1c.      The following 2010 guidelines have been recommended by the American Diabetes Association for Hemoglobin A1c.    HBA1c 5.7-6.4% Increased risk for future diabetes (pre-diabetes)  HBA1c     >6.4% Diabetes     POCT GLUCOSE FINGERSTICK - Abnormal; Notable for the following components:    Glucose 250 (*)     All other components within normal limits   POCT GLUCOSE FINGERSTICK - Abnormal; Notable for the following components:    Glucose 216 (*)     All  other components within normal limits   COVID-19,APTIMA PANTHER,[facility] IN-HOUSE,NP/OP SWAB IN UTM/VTM/[facility] [employer] MEDIA,24 HR TAT - Normal    Narrative:     Fact sheet for providers: https://www.fda.gov/media/262444/download     Fact sheet for patients: https://www.fda.gov/media/045674/download    Test performed by RT PCR.   POC LACTATE - Normal   COVID PRE-OP / PRE-PROCEDURE SCREENING ORDER (NO ISOLATION)    Narrative:     The following orders were created for panel order COVID PRE-OP / PRE-PROCEDURE SCREENING ORDER (NO ISOLATION) - Swab, Nasopharynx.  Procedure                               Abnormality         Status                     ---------                               -----------         ------                     COVID-19,APTIMA PANTHER,...[[id number]]  Normal              Final result                 Please view results for these tests on the individual orders.   SCAN SLIDE   POC LACTATE   POCT GLUCOSE FINGERSTICK   CBC AND DIFFERENTIAL    Narrative:     The following orders were created for panel order CBC & Differential.  Procedure                               Abnormality         Status                     ---------                               -----------         ------                     Scan Slide[[id number]]                                       Final result               CBC Auto Differential[[id number]]        Abnormal            Final result                 Please view results for these tests on the individual orders.     Medications   sodium chloride 0.9 % infusion (100 mL/hr Intravenous Currently Infusing 2/5/21 8509)   sodium chloride 0.9 % bolus 1,000 mL (0 mL Intravenous Stopped 2/4/21 2257)   ondansetron (ZOFRAN) injection 4 mg (4 mg Intravenous Given 2/4/21 2108)   HYDROmorphone (DILAUDID) injection 0.5 mg (0.5 mg Intravenous Given 2/4/21 2108)   ondansetron (ZOFRAN) injection 4 mg (4 mg Intravenous Given 2/4/21 2254)   HYDROmorphone (DILAUDID) injection 0.5 mg (0.5 mg Intravenous Given  2/4/21 7546)   bisacodyl (DULCOLAX) suppository 10 mg (10 mg Rectal Given 2/5/21 0588)     No radiology results for the last day    ED Course as of Feb 05 1807   Thu Feb 04, 2021   2121 Pt currently on bactrim, had lithotripsy today.    Urinalysis With Culture If Indicated - Urine, Clean Catch(!) [LB]   2247 Pt reports his pain and nausea are returning. No further emesis in the ED.     [LB]   2338 Pt tolerating PO fluids. No further emesis in the ED. He continues to have nausea. He unfortunately has no way of obtaining antiemetics tonight.     [LB]   Fri Feb 05, 2021   0001 Discussed with Dr. Le, Urology.     [LB]      ED Course User Index  [LB] Marija Beaver, APRN                                           MDM  Number of Diagnoses or Management Options  Flank pain:   Nausea:   Diagnosis management comments: Differentials: bowel obstruction, postoperative nausea vomiting, UTI  This list is not all inclusive and does not constitute the entireity of considered causes.     Labs reviewed by me and significant for the following: Abnormal Labs Reviewed  COMPREHENSIVE METABOLIC PANEL - Abnormal; Notable for the following components:     Glucose                       303 (*)                Creatinine                    1.54 (*)               Sodium                        133 (*)                Chloride                      96 (*)                 eGFR Non  Amer         44 (*)              All other components within normal limits         Narrative: GFR Normal >60                  Chronic Kidney Disease <60                  Kidney Failure <15                    URINALYSIS W/ CULTURE IF INDICATED - Abnormal; Notable for the following components:     Color, UA                     Orange (*)               Appearance, UA                Cloudy (*)               Glucose, UA                     (*)                  Ketones, UA                     (*)                  Blood, UA                     Large (3+) (*)                Protein, UA                     (*)                  Nitrite, UA                   Positive (*)            All other components within normal limits  LIPASE - Abnormal; Notable for the following components:     Lipase                        116 (*)             All other components within normal limits  CBC WITH AUTO DIFFERENTIAL - Abnormal; Notable for the following components:     WBC                           12.70 (*)            All other components within normal limits         Narrative: The previously reported component NRBC is no longer being reported. Previous result was 0.0 /100 WBC (Reference Range: 0.0-0.2 /100 WBC) on 2/4/2021 at 2120 EST.  URINALYSIS, MICROSCOPIC ONLY - Abnormal; Notable for the following components:     RBC, UA                         (*)                  WBC, UA                       3-5 (*)             All other components within normal limits  MANUAL DIFFERENTIAL - Abnormal; Notable for the following components:     Neutrophil %                  93.0 (*)               Lymphocyte %                  3.0 (*)                Monocyte %                    3.0 (*)                Neutrophils Absolute          11.81 (*)               Lymphocytes Absolute          0.38 (*)            All other components within normal limits  BASIC METABOLIC PANEL - Abnormal; Notable for the following components:     Glucose                       228 (*)                Creatinine                    1.50 (*)               Sodium                        132 (*)                eGFR Non  Amer         46 (*)              All other components within normal limits         Narrative: GFR Normal >60                  Chronic Kidney Disease <60                  Kidney Failure <15                    CBC WITH AUTO DIFFERENTIAL - Abnormal; Notable for the following components:     Hemoglobin                    12.8 (*)               Neutrophil %                  79.6 (*)               Lymphocyte %                   9.7 (*)                Neutrophils, Absolute         7.80 (*)               Monocytes, Absolute           1.00 (*)            All other components within normal limits  HEMOGLOBIN A1C - Abnormal; Notable for the following components:     Hemoglobin A1C                8.0 (*)             All other components within normal limits         Narrative: Hemoglobin A1C Reference Range:                                    <5.7 %        Normal                  5.7-6.4 %     Increased risk for diabetes                  > 6.4 %        Diabetes                                                       These guidelines have been recommended by the American Diabetic Association for Hgb A1c.                                                      The following 2010 guidelines have been recommended by the American Diabetes Association for Hemoglobin A1c.                                    HBA1c 5.7-6.4% Increased risk for future diabetes (pre-diabetes)                  HBA1c     >6.4% Diabetes                    POCT GLUCOSE FINGERSTICK - Abnormal; Notable for the following components:     Glucose                       250 (*)             All other components within normal limits  POCT GLUCOSE FINGERSTICK - Abnormal; Notable for the following components:     Glucose                       216 (*)             All other components within normal limits      Imaging, Interpreted per radiologist, independently viewed by myself: Not warranted    Patient was brought back to the emergency department room for evaluation and  placed on appropriate monitoring.   IV was established, blood work obtained.  Vital signs have  been reviewed. Patient is afebrile.  He was given IV fluids, pain medication as well as antiemetics.  He reports his pain is nausea have improved, however he continues to be nauseated despite multiple doses of Zofran.  Patient does appear slightly dehydrated, he states he is unable to obtain antiemetics tonight, he will be admitted  to the hospitalist overnight for continued hydration.    Plan and Disposition: I discussed with the patient their test results, work-up here in the emergency department, and need for admission and further evaluation.  Patient is agreeable to the plan of care.  Opportunity was provided for questions at the bedside, all questions and concerns were addressed.           Amount and/or Complexity of Data Reviewed  Clinical lab tests: reviewed  Decide to obtain previous medical records or to obtain history from someone other than the patient: yes  Discuss the patient with other providers: yes (Hospitalist)    Patient Progress  Patient progress: stable      Final diagnoses:   Nausea   Flank pain            Marija Beaver, APRN  02/05/21 1801

## 2021-02-05 NOTE — ED NOTES
Pt reports hx of HTN. Pt reports that he only took his losartan at 0600 per MD direction prior to sx intervention today.     Rigoberto Jeffries RN  02/04/21 1390

## 2021-02-08 NOTE — PROGRESS NOTES
Discharge Planning Assessment  [facility]     Patient Name: Reyna Escalona  MRN: [id number]  Today's Date: 2/8/2021    Admit Date: 2/4/2021          Plan    Final Discharge Disposition Code  01 - home or self-care    Final Note  return home       Carol naegele rn  Case management  Office number [phone]  Cell phone [phone]

## 2021-02-22 ENCOUNTER — TRANSCRIBE ORDERS (OUTPATIENT)
Dept: ADMINISTRATIVE | Facility: HOSPITAL | Age: 76
End: 2021-02-22

## 2021-02-22 ENCOUNTER — HOSPITAL ENCOUNTER (OUTPATIENT)
Dept: GENERAL RADIOLOGY | Facility: HOSPITAL | Age: 76
Discharge: HOME | End: 2021-02-22
Admitting: UROLOGY

## 2021-02-22 DIAGNOSIS — N20.0 CALCULUS, RENAL: Primary | ICD-10-CM

## 2021-02-22 DIAGNOSIS — N20.0 CALCULUS, RENAL: ICD-10-CM

## 2021-02-22 PROCEDURE — 74018 RADEX ABDOMEN 1 VIEW: CPT

## 2021-10-26 ENCOUNTER — TRANSCRIBE ORDERS (OUTPATIENT)
Dept: ADMINISTRATIVE | Facility: HOSPITAL | Age: 76
End: 2021-10-26

## 2021-10-26 ENCOUNTER — HOSPITAL ENCOUNTER (EMERGENCY)
Facility: HOSPITAL | Age: 76
Discharge: HOME | End: 2021-10-26
Attending: EMERGENCY MEDICINE

## 2021-10-26 ENCOUNTER — APPOINTMENT (OUTPATIENT)
Dept: CT IMAGING | Facility: HOSPITAL | Age: 76
End: 2021-10-26

## 2021-10-26 ENCOUNTER — HOSPITAL ENCOUNTER (OUTPATIENT)
Dept: CARDIOLOGY | Facility: HOSPITAL | Age: 76
Discharge: HOME | End: 2021-10-26

## 2021-10-26 VITALS
HEIGHT: 71 IN | BODY MASS INDEX: 36.94 KG/M2 | TEMPERATURE: 97.1 F | WEIGHT: 263.89 LBS | OXYGEN SATURATION: 100 % | HEART RATE: 78 BPM | DIASTOLIC BLOOD PRESSURE: 81 MMHG | SYSTOLIC BLOOD PRESSURE: 131 MMHG | RESPIRATION RATE: 16 BRPM

## 2021-10-26 DIAGNOSIS — N20.1 URETEROLITHIASIS: Primary | ICD-10-CM

## 2021-10-26 DIAGNOSIS — N20.0 KIDNEY STONES: Primary | ICD-10-CM

## 2021-10-26 LAB
ALBUMIN SERPL-MCNC: 4.3 G/DL (ref 3.5–5.2)
ALBUMIN/GLOB SERPL: 2 G/DL
ALP SERPL-CCNC: 55 U/L (ref 39–117)
ALT SERPL W P-5'-P-CCNC: 15 U/L (ref 1–41)
ANION GAP SERPL CALCULATED.3IONS-SCNC: 13 MMOL/L (ref 5–15)
AST SERPL-CCNC: 17 U/L (ref 1–40)
BACTERIA UR QL AUTO: ABNORMAL /HPF
BASOPHILS # BLD AUTO: 0 10*3/MM3 (ref 0–0.2)
BASOPHILS NFR BLD AUTO: 0.3 % (ref 0–1.5)
BILIRUB SERPL-MCNC: 0.2 MG/DL (ref 0–1.2)
BILIRUB UR QL STRIP: NEGATIVE
BUN SERPL-MCNC: 25 MG/DL (ref 8–23)
BUN/CREAT SERPL: 17.5 (ref 7–25)
CALCIUM SPEC-SCNC: 9.4 MG/DL (ref 8.6–10.5)
CHLORIDE SERPL-SCNC: 106 MMOL/L (ref 98–107)
CLARITY UR: ABNORMAL
CO2 SERPL-SCNC: 21 MMOL/L (ref 22–29)
COLOR UR: YELLOW
CREAT SERPL-MCNC: 1.43 MG/DL (ref 0.76–1.27)
DEPRECATED RDW RBC AUTO: 43.3 FL (ref 37–54)
EOSINOPHIL # BLD AUTO: 0.1 10*3/MM3 (ref 0–0.4)
EOSINOPHIL NFR BLD AUTO: 1.5 % (ref 0.3–6.2)
ERYTHROCYTE [DISTWIDTH] IN BLOOD BY AUTOMATED COUNT: 14.4 % (ref 12.3–15.4)
GFR SERPL CREATININE-BSD FRML MDRD: 48 ML/MIN/1.73
GLOBULIN UR ELPH-MCNC: 2.1 GM/DL
GLUCOSE SERPL-MCNC: 92 MG/DL (ref 65–99)
GLUCOSE UR STRIP-MCNC: NEGATIVE MG/DL
HCT VFR BLD AUTO: 40.2 % (ref 37.5–51)
HGB BLD-MCNC: 13.6 G/DL (ref 13–17.7)
HGB UR QL STRIP.AUTO: ABNORMAL
HYALINE CASTS UR QL AUTO: ABNORMAL /LPF
KETONES UR QL STRIP: NEGATIVE
LEUKOCYTE ESTERASE UR QL STRIP.AUTO: ABNORMAL
LIPASE SERPL-CCNC: 290 U/L (ref 13–60)
LYMPHOCYTES # BLD AUTO: 1.3 10*3/MM3 (ref 0.7–3.1)
LYMPHOCYTES NFR BLD AUTO: 14.8 % (ref 19.6–45.3)
MCH RBC QN AUTO: 28.9 PG (ref 26.6–33)
MCHC RBC AUTO-ENTMCNC: 33.8 G/DL (ref 31.5–35.7)
MCV RBC AUTO: 85.6 FL (ref 79–97)
MONOCYTES # BLD AUTO: 0.7 10*3/MM3 (ref 0.1–0.9)
MONOCYTES NFR BLD AUTO: 8 % (ref 5–12)
NEUTROPHILS NFR BLD AUTO: 6.5 10*3/MM3 (ref 1.7–7)
NEUTROPHILS NFR BLD AUTO: 75.4 % (ref 42.7–76)
NITRITE UR QL STRIP: NEGATIVE
NRBC BLD AUTO-RTO: 0 /100 WBC (ref 0–0.2)
PH UR STRIP.AUTO: <=5 [PH] (ref 5–8)
PLATELET # BLD AUTO: 192 10*3/MM3 (ref 140–450)
PMV BLD AUTO: 8.5 FL (ref 6–12)
POTASSIUM SERPL-SCNC: 4.3 MMOL/L (ref 3.5–5.2)
PROT SERPL-MCNC: 6.4 G/DL (ref 6–8.5)
PROT UR QL STRIP: ABNORMAL
RBC # BLD AUTO: 4.69 10*6/MM3 (ref 4.14–5.8)
RBC # UR: ABNORMAL /HPF
REF LAB TEST METHOD: ABNORMAL
SODIUM SERPL-SCNC: 140 MMOL/L (ref 136–145)
SP GR UR STRIP: 1.02 (ref 1–1.03)
SQUAMOUS #/AREA URNS HPF: ABNORMAL /HPF
UROBILINOGEN UR QL STRIP: ABNORMAL
WBC # BLD AUTO: 8.6 10*3/MM3 (ref 3.4–10.8)
WBC UR QL AUTO: ABNORMAL /HPF

## 2021-10-26 PROCEDURE — 93005 ELECTROCARDIOGRAM TRACING: CPT

## 2021-10-26 PROCEDURE — 85025 COMPLETE CBC W/AUTO DIFF WBC: CPT | Performed by: EMERGENCY MEDICINE

## 2021-10-26 PROCEDURE — 99283 EMERGENCY DEPT VISIT LOW MDM: CPT

## 2021-10-26 PROCEDURE — 25010000002 HYDROMORPHONE PER 4 MG: Performed by: EMERGENCY MEDICINE

## 2021-10-26 PROCEDURE — 96375 TX/PRO/DX INJ NEW DRUG ADDON: CPT

## 2021-10-26 PROCEDURE — 87086 URINE CULTURE/COLONY COUNT: CPT | Performed by: EMERGENCY MEDICINE

## 2021-10-26 PROCEDURE — 74176 CT ABD & PELVIS W/O CONTRAST: CPT

## 2021-10-26 PROCEDURE — 81001 URINALYSIS AUTO W/SCOPE: CPT | Performed by: EMERGENCY MEDICINE

## 2021-10-26 PROCEDURE — 83690 ASSAY OF LIPASE: CPT | Performed by: EMERGENCY MEDICINE

## 2021-10-26 PROCEDURE — 80053 COMPREHEN METABOLIC PANEL: CPT | Performed by: EMERGENCY MEDICINE

## 2021-10-26 PROCEDURE — 25010000002 ONDANSETRON PER 1 MG: Performed by: EMERGENCY MEDICINE

## 2021-10-26 PROCEDURE — 93010 ELECTROCARDIOGRAM REPORT: CPT | Performed by: INTERNAL MEDICINE

## 2021-10-26 PROCEDURE — 96374 THER/PROPH/DIAG INJ IV PUSH: CPT

## 2021-10-26 RX ORDER — HYDROMORPHONE HCL 110MG/55ML
0.5 PATIENT CONTROLLED ANALGESIA SYRINGE INTRAVENOUS ONCE
Status: COMPLETED | OUTPATIENT
Start: 2021-10-26 | End: 2021-10-26

## 2021-10-26 RX ORDER — SODIUM CHLORIDE 0.9 % (FLUSH) 0.9 %
10 SYRINGE (ML) INJECTION AS NEEDED
Status: DISCONTINUED | OUTPATIENT
Start: 2021-10-26 | End: 2021-10-26 | Stop reason: HOSPADM

## 2021-10-26 RX ORDER — ONDANSETRON 2 MG/ML
4 INJECTION INTRAMUSCULAR; INTRAVENOUS ONCE
Status: COMPLETED | OUTPATIENT
Start: 2021-10-26 | End: 2021-10-26

## 2021-10-26 RX ADMIN — HYDROMORPHONE HYDROCHLORIDE 0.5 MG: 2 INJECTION INTRAMUSCULAR; INTRAVENOUS; SUBCUTANEOUS at 02:35

## 2021-10-26 RX ADMIN — ONDANSETRON 4 MG: 2 INJECTION INTRAMUSCULAR; INTRAVENOUS at 02:34

## 2021-10-26 NOTE — DISCHARGE INSTRUCTIONS
Follow-up with urology today.  Follow-up with your primary care physician for further evaluation of the elevated lipase.  Return for increased pain, fever, persistent vomiting or any other concerns

## 2021-10-26 NOTE — ED PROVIDER NOTES
[facility]  Onsite Evalutation    Referral # [id number]    Date 01/30/2018 @ 15:00    Thank you for the referral of this patient. A chart review has been completed to determine suitability for organ donation.     Donation is an option.  - Upon meeting criteria for brain death, this patient will be a potential candidate for organ donation, pending further evaluation.   - This patient may meet the criteria for DCD (donation after circulatory death). Further evaluation will be needed should the family decide to withdraw support.     [facility] will continue to follow this case. Guidelines for the management of a potential organ donor have been provided for use at the physician's discretion. Please call us immediately with any problems, questions, or significant changes in the patient's status. Please call us immediately with any plans for brain death evaluation, family meetings or plans to withdraw care.     Organ donation should not be mentioned without prior collaboration with [facility] so that the conversation can be a planned, one-time coordinated event with the health care team.     Call [phone] with any immediate needs.     Thank you for your continued support of organ and tissue donation.    Subjective   History of Present Illness  Flank pain  76-year-old male with a history of ureteral stones states he has had some moderate left flank pain that started today and associate with nausea and episode of vomiting.  He denies fevers chills or trauma.  He states he has noticed some small blood in his urine.  Review of Systems   Constitutional: Negative.    HENT: Negative.    Eyes: Negative.    Respiratory: Negative.    Cardiovascular: Negative.    Gastrointestinal: Positive for abdominal pain.   Genitourinary: Positive for flank pain and hematuria.   Musculoskeletal: Positive for back pain.   Skin: Negative.    Neurological: Negative.    Psychiatric/Behavioral: Negative.        Past Medical History:   Diagnosis Date   • Diabetes mellitus (CMS/[facility])    • Hyperlipidemia    • Hypertension    • Obesity (BMI 30-39.9) 2/5/2021   • Prostate cancer (CMS/[facility])    • Spinal stenosis        Allergies   Allergen Reactions   • Erythromycin GI Intolerance   • Morphine GI Intolerance       Past Surgical History:   Procedure Laterality Date   • BACK SURGERY     • CYSTOSCOPY, URETEROSCOPY, RETROGRADE PYELOGRAM, STENT INSERTION Left 10/26/2020    Procedure: CYSTOSCOPY left URETEROSCOPY RETROGRADE PYELOGRAM and STENT INSERTION;  Surgeon: Walker Elaine MD;  Location: [facility];  Service: Urology;  Laterality: Left;   • HERNIA REPAIR     • JOINT REPLACEMENT     • KIDNEY STONE SURGERY     • ROTATOR CUFF REPAIR     • TUMOR REMOVAL         Family History   Problem Relation Age of Onset   • Cancer Mother    • Nephrolithiasis Father        Social History     Socioeconomic History   • Marital status:    Tobacco Use   • Smoking status: Current Some Day Smoker     Types: Cigars   • Smokeless tobacco: Never Used   • Tobacco comment: occcasional   Substance and Sexual Activity   • Alcohol use: Yes   • Drug use: No   • Sexual activity: Defer       Prior to Admission medications    Medication Sig Start Date End Date Taking?  "Authorizing Provider   docusate sodium 100 MG capsule Take 1 capsule by mouth 2 (Two) Times a Day As Needed for Constipation. 2/5/21   Omar Greenwood DO   gabapentin (NEURONTIN) 100 MG capsule Take 100 mg by mouth 2 (two) times a day.    Jesus Alberto Jaimes MD   glipizide (GLUCOTROL) 5 MG tablet Take 5 mg by mouth 2 (Two) Times a Day Before Meals.    Jesus Alberto Jaimes MD   hydroCHLOROthiazide (HYDRODIURIL) 25 MG tablet Take 25 mg by mouth Daily.    Jesus Alberto Jaimes MD   HYDROcodone-acetaminophen (NORCO) 7.5-325 MG per tablet Take 1-2 tablets by mouth Every 6 (Six) Hours As Needed for Moderate Pain . 10/8/20   Nathaniel Colunga MD   losartan (COZAAR) 25 MG tablet Take 25 mg by mouth Daily.    Jesus Alberto Jaimes MD   metFORMIN (GLUCOPHAGE) 1000 MG tablet Take 1,000 mg by mouth 2 (Two) Times a Day With Meals. 2/19/14   Emergency, Nurse Epic, RN   naproxen (NAPROSYN) 500 MG tablet Take 500 mg by mouth 2 (Two) Times a Day As Needed for Mild Pain .    Jesus Alberto Jaimes MD   oxyCODONE-acetaminophen (PERCOCET) 7.5-325 MG per tablet Take 1 tablet by mouth Every 8 (Eight) Hours As Needed.    Jesus Alberto Jaimes MD   polyethylene glycol (MIRALAX) 17 g packet Take 17 g by mouth Daily. 2/6/21   Omar Greenwood DO   sildenafil (VIAGRA) 100 MG tablet Take 100 mg by mouth Daily As Needed for Erectile Dysfunction.    Jesus Alberto Jaimes MD   simvastatin (ZOCOR) 20 MG tablet Take 10 mg by mouth Every Night.    Jesus Alberto Jaimes MD   tamsulosin (FLOMAX) 0.4 MG capsule 24 hr capsule Take 2 capsules by mouth Every Night.    Jesus Alberto Jaimes MD     /85   Pulse 80   Temp 97.2 °F (36.2 °C) (Oral)   Resp 18   Ht 180.3 cm (71\")   Wt 120 kg (263 lb 14.3 oz)   SpO2 100%   BMI 36.81 kg/m²   I examined the patient using the appropriate personal protective equipment.        Objective   Physical Exam  General: Well-developed well-appearing, no acute distress, alert and appropriate  Eyes:  sclera " nonicteric  HEENT: Mucous membranes moist, no mucosal swelling  Neck: Supple, no nuchal rigidity,   Respirations: Respirations nonlabored, equal breath sounds bilaterally, clear lungs  Heart regular rate and rhythm, no murmurs rubs or gallops,   Abdomen soft nontender nondistended, no hepatosplenomegaly, no hernia, no mass, normal bowel sounds, left CVA tenderness  Extremities no clubbing cyanosis or edema, calves are symmetric and nontender  Neuro cranial nerves grossly intact, no focal limb deficits  Psych oriented, pleasant affect  Skin no rash, brisk cap refill  Procedures           ED Course      Results for orders placed or performed during the hospital encounter of 10/26/21   Comprehensive Metabolic Panel    Specimen: Blood   Result Value Ref Range    Glucose 92 65 - 99 mg/dL    BUN 25 (H) 8 - 23 mg/dL    Creatinine 1.43 (H) 0.76 - 1.27 mg/dL    Sodium 140 136 - 145 mmol/L    Potassium 4.3 3.5 - 5.2 mmol/L    Chloride 106 98 - 107 mmol/L    CO2 21.0 (L) 22.0 - 29.0 mmol/L    Calcium 9.4 8.6 - 10.5 mg/dL    Total Protein 6.4 6.0 - 8.5 g/dL    Albumin 4.30 3.50 - 5.20 g/dL    ALT (SGPT) 15 1 - 41 U/L    AST (SGOT) 17 1 - 40 U/L    Alkaline Phosphatase 55 39 - 117 U/L    Total Bilirubin 0.2 0.0 - 1.2 mg/dL    eGFR Non African Amer 48 (L) >60 mL/min/1.73    Globulin 2.1 gm/dL    A/G Ratio 2.0 g/dL    BUN/Creatinine Ratio 17.5 7.0 - 25.0    Anion Gap 13.0 5.0 - 15.0 mmol/L   Lipase    Specimen: Blood   Result Value Ref Range    Lipase 290 (H) 13 - 60 U/L   Urinalysis With Culture If Indicated - Urine, Clean Catch    Specimen: Urine, Clean Catch   Result Value Ref Range    Color, UA Yellow Yellow, Straw    Appearance, UA Hazy (A) Clear    pH, UA <=5.0 5.0 - 8.0    Specific Gravity, UA 1.024 1.005 - 1.030    Glucose, UA Negative Negative    Ketones, UA Negative Negative    Bilirubin, UA Negative Negative    Blood, UA Large (3+) (A) Negative    Protein,  mg/dL (2+) (A) Negative    Leuk Esterase, UA Trace (A)  Negative    Nitrite, UA Negative Negative    Urobilinogen, UA 0.2 E.U./dL 0.2 - 1.0 E.U./dL   CBC Auto Differential    Specimen: Blood   Result Value Ref Range    WBC 8.60 3.40 - 10.80 10*3/mm3    RBC 4.69 4.14 - 5.80 10*6/mm3    Hemoglobin 13.6 13.0 - 17.7 g/dL    Hematocrit 40.2 37.5 - 51.0 %    MCV 85.6 79.0 - 97.0 fL    MCH 28.9 26.6 - 33.0 pg    MCHC 33.8 31.5 - 35.7 g/dL    RDW 14.4 12.3 - 15.4 %    RDW-SD 43.3 37.0 - 54.0 fl    MPV 8.5 6.0 - 12.0 fL    Platelets 192 140 - 450 10*3/mm3    Neutrophil % 75.4 42.7 - 76.0 %    Lymphocyte % 14.8 (L) 19.6 - 45.3 %    Monocyte % 8.0 5.0 - 12.0 %    Eosinophil % 1.5 0.3 - 6.2 %    Basophil % 0.3 0.0 - 1.5 %    Neutrophils, Absolute 6.50 1.70 - 7.00 10*3/mm3    Lymphocytes, Absolute 1.30 0.70 - 3.10 10*3/mm3    Monocytes, Absolute 0.70 0.10 - 0.90 10*3/mm3    Eosinophils, Absolute 0.10 0.00 - 0.40 10*3/mm3    Basophils, Absolute 0.00 0.00 - 0.20 10*3/mm3    nRBC 0.0 0.0 - 0.2 /100 WBC   Urinalysis, Microscopic Only - Urine, Clean Catch    Specimen: Urine, Clean Catch   Result Value Ref Range    RBC, UA Too Numerous to Count (A) None Seen /HPF    WBC, UA 13-20 (A) None Seen /HPF    Bacteria, UA None Seen None Seen /HPF    Squamous Epithelial Cells, UA 0-2 None Seen, 0-2 /HPF    Hyaline Casts, UA 7-12 None Seen /LPF    Methodology Manual Light Microscopy            CT Abdomen Pelvis Without Contrast   Final Result   1. 5.1 mm stone in the distal left ureter, just proximal to the ureterovesical junction with moderate left-sided hydronephrosis.   2. Nonobstructing left renal stones are also seen.   3. No bowel obstruction or appendicitis.            Electronically signed by:  Ye Haddad D.O.     10/26/2021 1:51 AM                                          MDM  Patient lipase is elevated but in review of the records this is a chronic finding. He states he has not been told that in the past. He was advised to follow-up with his primary care doctor for further evaluation.  Today's pain is not that of pancreatitis but of ureterolithiasis and he does have a 5 mm distal ureteral stone.  Patient's pain is under control after some pain medication the emergency room.  He is resting comfortably.  Case discussed with Dr. Baum and patient will be discharged home to follow-up with urology office today.  He was given warning signs for return.  Final diagnoses:   Ureterolithiasis       ED Disposition  ED Disposition     ED Disposition Condition Comment    Discharge Stable           [facility] - [place] [id number] [facility] [address]  [phone]  Call today           Medication List      No changes were made to your prescriptions during this visit.          Sha Chen MD  10/26/21 0428

## 2021-10-27 LAB — BACTERIA SPEC AEROBE CULT: NO GROWTH

## 2021-10-30 LAB — QT INTERVAL: 352 MS

## 2022-02-19 PROCEDURE — U0004 COV-19 TEST NON-CDC HGH THRU: HCPCS | Performed by: NURSE PRACTITIONER

## 2022-06-01 ENCOUNTER — OFFICE VISIT (OUTPATIENT)
Dept: SURGERY | Facility: CLINIC | Age: 77
End: 2022-06-01

## 2022-06-01 VITALS
DIASTOLIC BLOOD PRESSURE: 79 MMHG | TEMPERATURE: 98.6 F | SYSTOLIC BLOOD PRESSURE: 171 MMHG | BODY MASS INDEX: 35.7 KG/M2 | OXYGEN SATURATION: 95 % | HEART RATE: 88 BPM | WEIGHT: 255 LBS | HEIGHT: 71 IN

## 2022-06-01 DIAGNOSIS — K40.91 RECURRENT RIGHT INGUINAL HERNIA: Primary | ICD-10-CM

## 2022-06-01 PROCEDURE — 99204 OFFICE O/P NEW MOD 45 MIN: CPT | Performed by: SURGERY

## 2022-06-01 RX ORDER — SODIUM CHLORIDE 9 MG/ML
100 INJECTION, SOLUTION INTRAVENOUS CONTINUOUS
Status: CANCELLED | OUTPATIENT
Start: 2022-06-01

## 2022-06-01 RX ORDER — CHLORHEXIDINE GLUCONATE 0.12 MG/ML
15 RINSE ORAL EVERY 12 HOURS SCHEDULED
Status: CANCELLED | OUTPATIENT
Start: 2022-06-01

## 2022-06-02 NOTE — PROGRESS NOTES
GENERAL SURGERY CONSULTATION NOTE    Consult requested by: Dr. Elaine    Patient Care Team:  Provider, No Known as PCP - General  Jareth Curry MD as Surgeon (General Surgery)    Reason for consult: Recurrent right inguinal hernia    Subjective     Patient is a 77 y.o. male presents with a symptomatic recurrent right inguinal hernia.  Patient has previously had bilateral inguinal hernias repaired as well as a right lower quadrant inguinal hernia which was repaired many years ago.  Both of these hernias in his groin were repaired without the use of mesh many years ago, and the patient over the course of the last 2 years or so has noticed a bulge in his right groin.  At first it was small, and he was told that he could continue to observe this, but over the course of that time this is gotten larger, and now is painful especially when he strains.  He reports that he has difficulty urinating at baseline, and occasionally will have constipation.  He had a CT scan of the abdomen and pelvis which was performed on 2/1/2022 which demonstrated a small fat-containing right inguinal hernia.    Review of Systems   Constitutional: Negative for appetite change, chills and fever.   HENT: Negative for congestion and sore throat.    Respiratory: Negative for cough and shortness of breath.    Cardiovascular: Negative for chest pain and palpitations.   Gastrointestinal: Negative for abdominal pain, constipation, diarrhea, nausea, vomiting and GERD.   Genitourinary: Positive for decreased urine volume, difficulty urinating, frequency and scrotal swelling. Negative for dysuria.   Musculoskeletal: Negative for arthralgias and back pain.   Skin: Negative for rash and skin lesions.   Neurological: Negative for dizziness, seizures and memory problem.   Hematological: Negative for adenopathy. Does not bruise/bleed easily.   Psychiatric/Behavioral: Negative for sleep disturbance and depressed mood.        History  Past Medical  History:   Diagnosis Date   • Chronic kidney disease    • Diabetes mellitus (HCC)    • Hyperlipidemia    • Hypertension    • Obesity (BMI 30-39.9) 02/05/2021   • Prostate cancer (HCC)    • Spinal stenosis      Past Surgical History:   Procedure Laterality Date   • BACK SURGERY     • CYSTOSCOPY, URETEROSCOPY, RETROGRADE PYELOGRAM, STENT INSERTION Left 10/26/2020    Procedure: CYSTOSCOPY left URETEROSCOPY RETROGRADE PYELOGRAM and STENT INSERTION;  Surgeon: Walker Elaine MD;  Location: [facility];  Service: Urology;  Laterality: Left;   • HERNIA REPAIR     • JOINT REPLACEMENT     • KIDNEY STONE SURGERY     • ROTATOR CUFF REPAIR     • TUMOR REMOVAL       Family History   Problem Relation Age of Onset   • Cancer Mother    • Nephrolithiasis Father      Social History     Tobacco Use   • Smoking status: Former Smoker     Types: Cigars   • Smokeless tobacco: Never Used   • Tobacco comment: occcasional   Vaping Use   • Vaping Use: Never used   Substance Use Topics   • Alcohol use: Yes   • Drug use: No     (Not in a hospital admission)    Allergies:  Erythromycin and Morphine    Objective     Vital Signs       Physical Exam  Vitals reviewed.   Constitutional:       Appearance: He is well-developed.   HENT:      Head: Normocephalic and atraumatic.   Eyes:      Pupils: Pupils are equal, round, and reactive to light.   Cardiovascular:      Rate and Rhythm: Normal rate and regular rhythm.   Pulmonary:      Effort: Pulmonary effort is normal.      Breath sounds: Normal breath sounds.   Abdominal:      General: There is no distension.      Palpations: Abdomen is soft.      Tenderness: There is no abdominal tenderness.      Hernia: A hernia is present. Hernia is present in the right inguinal area. There is no hernia in the left inguinal area.   Genitourinary:     Penis: Normal.       Testes: Normal.      Comments: Incisions in the bilateral groins are well-healed.  Musculoskeletal:         General: Normal range of motion.       Cervical back: Normal range of motion.   Lymphadenopathy:      Cervical: No cervical adenopathy.   Skin:     General: Skin is warm and dry.      Findings: No rash.   Neurological:      Mental Status: He is alert and oriented to person, place, and time.         Results Review:   Lab Results (last 24 hours)     ** No results found for the last 24 hours. **        No radiology results for the last day      I reviewed the patient's new imaging results and agree with the interpretation.  I reviewed the patient's other test results and agree with the interpretation    Assessment & Plan     Active Problems:  Recurrent right inguinal hernia    I discussed with the patient the natural history of inguinal hernias, and we discussed their surgical management which includes both nonoperative and operative management.  We discussed watchful waiting as a potential management strategy for asymptomatic inguinal hernias.  We discussed operative management with both laparoscopic and open techniques.  The risks of these surgeries were discussed with the patient which include, but are not limited to bleeding, infection, damage to the spermatic cord vessels, damage to the ilioinguinal nerve resulting in chronic pain, mesh infection, and recurrence.  Patient understands that given his prior history of difficulty with urination he is at increased risk for urinary retention postoperatively.  He will continue to use his Flomax as directed.  The patient understands, and agrees to proceed with open repair of right inguinal hernia with mesh.  We discussed signs and symptoms of incarceration and strangulation, and the patient understands that should he develop any such symptoms he should call my office immediately or present to the emergency room for prompt evaluation.    I discussed the patients findings and my recommendations with the patient.     Jareth Curry MD  06/02/22  15:54 EDT

## 2022-06-02 NOTE — H&P (VIEW-ONLY)
GENERAL SURGERY CONSULTATION NOTE    Consult requested by: Dr. Elaine    Patient Care Team:  Provider, No Known as PCP - General  Jareth Curry MD as Surgeon (General Surgery)    Reason for consult: Recurrent right inguinal hernia    Subjective     Patient is a 77 y.o. male presents with a symptomatic recurrent right inguinal hernia.  Patient has previously had bilateral inguinal hernias repaired as well as a right lower quadrant inguinal hernia which was repaired many years ago.  Both of these hernias in his groin were repaired without the use of mesh many years ago, and the patient over the course of the last 2 years or so has noticed a bulge in his right groin.  At first it was small, and he was told that he could continue to observe this, but over the course of that time this is gotten larger, and now is painful especially when he strains.  He reports that he has difficulty urinating at baseline, and occasionally will have constipation.  He had a CT scan of the abdomen and pelvis which was performed on 2/1/2022 which demonstrated a small fat-containing right inguinal hernia.    Review of Systems   Constitutional: Negative for appetite change, chills and fever.   HENT: Negative for congestion and sore throat.    Respiratory: Negative for cough and shortness of breath.    Cardiovascular: Negative for chest pain and palpitations.   Gastrointestinal: Negative for abdominal pain, constipation, diarrhea, nausea, vomiting and GERD.   Genitourinary: Positive for decreased urine volume, difficulty urinating, frequency and scrotal swelling. Negative for dysuria.   Musculoskeletal: Negative for arthralgias and back pain.   Skin: Negative for rash and skin lesions.   Neurological: Negative for dizziness, seizures and memory problem.   Hematological: Negative for adenopathy. Does not bruise/bleed easily.   Psychiatric/Behavioral: Negative for sleep disturbance and depressed mood.        History  Past Medical  History:   Diagnosis Date   • Chronic kidney disease    • Diabetes mellitus (HCC)    • Hyperlipidemia    • Hypertension    • Obesity (BMI 30-39.9) 02/05/2021   • Prostate cancer (HCC)    • Spinal stenosis      Past Surgical History:   Procedure Laterality Date   • BACK SURGERY     • CYSTOSCOPY, URETEROSCOPY, RETROGRADE PYELOGRAM, STENT INSERTION Left 10/26/2020    Procedure: CYSTOSCOPY left URETEROSCOPY RETROGRADE PYELOGRAM and STENT INSERTION;  Surgeon: Walker Elaine MD;  Location: [facility];  Service: Urology;  Laterality: Left;   • HERNIA REPAIR     • JOINT REPLACEMENT     • KIDNEY STONE SURGERY     • ROTATOR CUFF REPAIR     • TUMOR REMOVAL       Family History   Problem Relation Age of Onset   • Cancer Mother    • Nephrolithiasis Father      Social History     Tobacco Use   • Smoking status: Former Smoker     Types: Cigars   • Smokeless tobacco: Never Used   • Tobacco comment: occcasional   Vaping Use   • Vaping Use: Never used   Substance Use Topics   • Alcohol use: Yes   • Drug use: No     (Not in a hospital admission)    Allergies:  Erythromycin and Morphine    Objective     Vital Signs       Physical Exam  Vitals reviewed.   Constitutional:       Appearance: He is well-developed.   HENT:      Head: Normocephalic and atraumatic.   Eyes:      Pupils: Pupils are equal, round, and reactive to light.   Cardiovascular:      Rate and Rhythm: Normal rate and regular rhythm.   Pulmonary:      Effort: Pulmonary effort is normal.      Breath sounds: Normal breath sounds.   Abdominal:      General: There is no distension.      Palpations: Abdomen is soft.      Tenderness: There is no abdominal tenderness.      Hernia: A hernia is present. Hernia is present in the right inguinal area. There is no hernia in the left inguinal area.   Genitourinary:     Penis: Normal.       Testes: Normal.      Comments: Incisions in the bilateral groins are well-healed.  Musculoskeletal:         General: Normal range of motion.       Cervical back: Normal range of motion.   Lymphadenopathy:      Cervical: No cervical adenopathy.   Skin:     General: Skin is warm and dry.      Findings: No rash.   Neurological:      Mental Status: He is alert and oriented to person, place, and time.         Results Review:   Lab Results (last 24 hours)     ** No results found for the last 24 hours. **        No radiology results for the last day      I reviewed the patient's new imaging results and agree with the interpretation.  I reviewed the patient's other test results and agree with the interpretation    Assessment & Plan     Active Problems:  Recurrent right inguinal hernia    I discussed with the patient the natural history of inguinal hernias, and we discussed their surgical management which includes both nonoperative and operative management.  We discussed watchful waiting as a potential management strategy for asymptomatic inguinal hernias.  We discussed operative management with both laparoscopic and open techniques.  The risks of these surgeries were discussed with the patient which include, but are not limited to bleeding, infection, damage to the spermatic cord vessels, damage to the ilioinguinal nerve resulting in chronic pain, mesh infection, and recurrence.  Patient understands that given his prior history of difficulty with urination he is at increased risk for urinary retention postoperatively.  He will continue to use his Flomax as directed.  The patient understands, and agrees to proceed with open repair of right inguinal hernia with mesh.  We discussed signs and symptoms of incarceration and strangulation, and the patient understands that should he develop any such symptoms he should call my office immediately or present to the emergency room for prompt evaluation.    I discussed the patients findings and my recommendations with the patient.     Jareth Curry MD  06/02/22  15:54 EDT

## 2022-06-03 RX ORDER — NAPROXEN 250 MG/1
250 TABLET ORAL 2 TIMES DAILY PRN
COMMUNITY

## 2022-06-03 RX ORDER — METOPROLOL SUCCINATE 50 MG/1
50 TABLET, EXTENDED RELEASE ORAL DAILY
COMMUNITY

## 2022-06-03 NOTE — PRE-PROCEDURE INSTRUCTIONS
preop instructions given.  Had to finish call with wife as pt became agitated with questions and instructions

## 2022-06-06 ENCOUNTER — HOSPITAL ENCOUNTER (OUTPATIENT)
Dept: GENERAL RADIOLOGY | Facility: HOSPITAL | Age: 77
Discharge: HOME | End: 2022-06-06

## 2022-06-06 ENCOUNTER — HOSPITAL ENCOUNTER (OUTPATIENT)
Dept: CARDIOLOGY | Facility: HOSPITAL | Age: 77
Discharge: HOME | End: 2022-06-06

## 2022-06-06 ENCOUNTER — LAB (OUTPATIENT)
Dept: LAB | Facility: HOSPITAL | Age: 77
End: 2022-06-06

## 2022-06-06 DIAGNOSIS — K40.91 RECURRENT RIGHT INGUINAL HERNIA: ICD-10-CM

## 2022-06-06 LAB
ALBUMIN SERPL-MCNC: 4.4 G/DL (ref 3.5–5.2)
ALBUMIN/GLOB SERPL: 2 G/DL
ALP SERPL-CCNC: 54 U/L (ref 39–117)
ALT SERPL W P-5'-P-CCNC: 13 U/L (ref 1–41)
ANION GAP SERPL CALCULATED.3IONS-SCNC: 9.5 MMOL/L (ref 5–15)
AST SERPL-CCNC: 11 U/L (ref 1–40)
BASOPHILS # BLD AUTO: 0.04 10*3/MM3 (ref 0–0.2)
BASOPHILS NFR BLD AUTO: 0.6 % (ref 0–1.5)
BILIRUB SERPL-MCNC: 0.3 MG/DL (ref 0–1.2)
BILIRUB UR QL STRIP: NEGATIVE
BUN SERPL-MCNC: 30 MG/DL (ref 8–23)
BUN/CREAT SERPL: 17.8 (ref 7–25)
CALCIUM SPEC-SCNC: 9.4 MG/DL (ref 8.6–10.5)
CHLORIDE SERPL-SCNC: 102 MMOL/L (ref 98–107)
CLARITY UR: ABNORMAL
CO2 SERPL-SCNC: 26.5 MMOL/L (ref 22–29)
COLOR UR: YELLOW
CREAT SERPL-MCNC: 1.69 MG/DL (ref 0.76–1.27)
DEPRECATED RDW RBC AUTO: 39.8 FL (ref 37–54)
EGFRCR SERPLBLD CKD-EPI 2021: 41.3 ML/MIN/1.73
EOSINOPHIL # BLD AUTO: 0.13 10*3/MM3 (ref 0–0.4)
EOSINOPHIL NFR BLD AUTO: 2.1 % (ref 0.3–6.2)
ERYTHROCYTE [DISTWIDTH] IN BLOOD BY AUTOMATED COUNT: 13.1 % (ref 12.3–15.4)
GLOBULIN UR ELPH-MCNC: 2.2 GM/DL
GLUCOSE SERPL-MCNC: 210 MG/DL (ref 65–99)
GLUCOSE UR STRIP-MCNC: ABNORMAL MG/DL
HCT VFR BLD AUTO: 37.9 % (ref 37.5–51)
HGB BLD-MCNC: 12.6 G/DL (ref 13–17.7)
HGB UR QL STRIP.AUTO: ABNORMAL
IMM GRANULOCYTES # BLD AUTO: 0.09 10*3/MM3 (ref 0–0.05)
IMM GRANULOCYTES NFR BLD AUTO: 1.4 % (ref 0–0.5)
KETONES UR QL STRIP: NEGATIVE
LEUKOCYTE ESTERASE UR QL STRIP.AUTO: ABNORMAL
LYMPHOCYTES # BLD AUTO: 0.87 10*3/MM3 (ref 0.7–3.1)
LYMPHOCYTES NFR BLD AUTO: 14 % (ref 19.6–45.3)
MCH RBC QN AUTO: 28.1 PG (ref 26.6–33)
MCHC RBC AUTO-ENTMCNC: 33.2 G/DL (ref 31.5–35.7)
MCV RBC AUTO: 84.4 FL (ref 79–97)
MONOCYTES # BLD AUTO: 0.5 10*3/MM3 (ref 0.1–0.9)
MONOCYTES NFR BLD AUTO: 8 % (ref 5–12)
NEUTROPHILS NFR BLD AUTO: 4.59 10*3/MM3 (ref 1.7–7)
NEUTROPHILS NFR BLD AUTO: 73.9 % (ref 42.7–76)
NITRITE UR QL STRIP: NEGATIVE
NRBC BLD AUTO-RTO: 0 /100 WBC (ref 0–0.2)
PH UR STRIP.AUTO: 5.5 [PH] (ref 5–8)
PLATELET # BLD AUTO: 207 10*3/MM3 (ref 140–450)
PMV BLD AUTO: 11.5 FL (ref 6–12)
POTASSIUM SERPL-SCNC: 4.2 MMOL/L (ref 3.5–5.2)
PROT SERPL-MCNC: 6.6 G/DL (ref 6–8.5)
PROT UR QL STRIP: ABNORMAL
RBC # BLD AUTO: 4.49 10*6/MM3 (ref 4.14–5.8)
SODIUM SERPL-SCNC: 138 MMOL/L (ref 136–145)
SP GR UR STRIP: 1.02 (ref 1–1.03)
UROBILINOGEN UR QL STRIP: ABNORMAL
WBC NRBC COR # BLD: 6.22 10*3/MM3 (ref 3.4–10.8)

## 2022-06-06 PROCEDURE — 85025 COMPLETE CBC W/AUTO DIFF WBC: CPT

## 2022-06-06 PROCEDURE — 71046 X-RAY EXAM CHEST 2 VIEWS: CPT

## 2022-06-06 PROCEDURE — 81003 URINALYSIS AUTO W/O SCOPE: CPT

## 2022-06-06 PROCEDURE — 36415 COLL VENOUS BLD VENIPUNCTURE: CPT

## 2022-06-06 PROCEDURE — 80053 COMPREHEN METABOLIC PANEL: CPT

## 2022-06-06 PROCEDURE — 93005 ELECTROCARDIOGRAM TRACING: CPT | Performed by: SURGERY

## 2022-06-06 PROCEDURE — 93010 ELECTROCARDIOGRAM REPORT: CPT | Performed by: INTERNAL MEDICINE

## 2022-06-09 LAB — QT INTERVAL: 388 MS

## 2022-06-10 ENCOUNTER — LAB (OUTPATIENT)
Dept: LAB | Facility: HOSPITAL | Age: 77
End: 2022-06-10

## 2022-06-10 ENCOUNTER — ANESTHESIA EVENT (OUTPATIENT)
Dept: PERIOP | Facility: HOSPITAL | Age: 77
End: 2022-06-10

## 2022-06-10 ENCOUNTER — HOSPITAL ENCOUNTER (OUTPATIENT)
Dept: CT IMAGING | Facility: HOSPITAL | Age: 77
Discharge: HOME | End: 2022-06-10

## 2022-06-10 DIAGNOSIS — K40.91 RECURRENT RIGHT INGUINAL HERNIA: ICD-10-CM

## 2022-06-10 LAB — SARS-COV-2 ORF1AB RESP QL NAA+PROBE: NOT DETECTED

## 2022-06-10 PROCEDURE — 72192 CT PELVIS W/O DYE: CPT

## 2022-06-10 PROCEDURE — U0004 COV-19 TEST NON-CDC HGH THRU: HCPCS

## 2022-06-10 PROCEDURE — C9803 HOPD COVID-19 SPEC COLLECT: HCPCS

## 2022-06-13 ENCOUNTER — ANESTHESIA (OUTPATIENT)
Dept: PERIOP | Facility: HOSPITAL | Age: 77
End: 2022-06-13

## 2022-06-13 ENCOUNTER — HOSPITAL ENCOUNTER (OUTPATIENT)
Facility: HOSPITAL | Age: 77
Setting detail: HOSPITAL OUTPATIENT SURGERY
Discharge: HOME | End: 2022-06-13
Attending: SURGERY | Admitting: SURGERY

## 2022-06-13 VITALS
SYSTOLIC BLOOD PRESSURE: 175 MMHG | TEMPERATURE: 97 F | RESPIRATION RATE: 17 BRPM | DIASTOLIC BLOOD PRESSURE: 90 MMHG | WEIGHT: 254 LBS | OXYGEN SATURATION: 98 % | HEIGHT: 71 IN | BODY MASS INDEX: 35.56 KG/M2 | HEART RATE: 64 BPM

## 2022-06-13 DIAGNOSIS — K40.91 RECURRENT RIGHT INGUINAL HERNIA: ICD-10-CM

## 2022-06-13 LAB — GLUCOSE BLDC GLUCOMTR-MCNC: 179 MG/DL (ref 70–105)

## 2022-06-13 PROCEDURE — 25010000002 ONDANSETRON PER 1 MG

## 2022-06-13 PROCEDURE — 88302 TISSUE EXAM BY PATHOLOGIST: CPT | Performed by: SURGERY

## 2022-06-13 PROCEDURE — 49520 REREPAIR ING HERNIA REDUCE: CPT | Performed by: SPECIALIST/TECHNOLOGIST, OTHER

## 2022-06-13 PROCEDURE — 25010000002 FENTANYL CITRATE (PF) 50 MCG/ML SOLUTION

## 2022-06-13 PROCEDURE — 25010000002 CEFAZOLIN PER 500 MG: Performed by: SURGERY

## 2022-06-13 PROCEDURE — C9290 INJ, BUPIVACAINE LIPOSOME: HCPCS | Performed by: ANESTHESIOLOGY

## 2022-06-13 PROCEDURE — 0 BUPIVACAINE LIPOSOME 1.3 % SUSPENSION: Performed by: ANESTHESIOLOGY

## 2022-06-13 PROCEDURE — 82962 GLUCOSE BLOOD TEST: CPT

## 2022-06-13 PROCEDURE — 25010000002 HYDROMORPHONE 1 MG/ML SOLUTION

## 2022-06-13 PROCEDURE — 49520 REREPAIR ING HERNIA REDUCE: CPT | Performed by: SURGERY

## 2022-06-13 PROCEDURE — 25010000002 PROPOFOL 10 MG/ML EMULSION

## 2022-06-13 PROCEDURE — 25010000002 DEXAMETHASONE PER 1 MG

## 2022-06-13 PROCEDURE — C1781 MESH (IMPLANTABLE): HCPCS | Performed by: SURGERY

## 2022-06-13 DEVICE — MESH FLUT SHT 3X6IN: Type: IMPLANTABLE DEVICE | Site: INGUINAL | Status: FUNCTIONAL

## 2022-06-13 RX ORDER — NALOXONE HCL 0.4 MG/ML
0.4 VIAL (ML) INJECTION AS NEEDED
Status: DISCONTINUED | OUTPATIENT
Start: 2022-06-13 | End: 2022-06-13 | Stop reason: HOSPADM

## 2022-06-13 RX ORDER — IPRATROPIUM BROMIDE AND ALBUTEROL SULFATE 2.5; .5 MG/3ML; MG/3ML
3 SOLUTION RESPIRATORY (INHALATION) ONCE AS NEEDED
Status: DISCONTINUED | OUTPATIENT
Start: 2022-06-13 | End: 2022-06-13 | Stop reason: HOSPADM

## 2022-06-13 RX ORDER — FLUMAZENIL 0.1 MG/ML
0.2 INJECTION INTRAVENOUS AS NEEDED
Status: DISCONTINUED | OUTPATIENT
Start: 2022-06-13 | End: 2022-06-13 | Stop reason: HOSPADM

## 2022-06-13 RX ORDER — LIDOCAINE HYDROCHLORIDE 10 MG/ML
INJECTION, SOLUTION EPIDURAL; INFILTRATION; INTRACAUDAL; PERINEURAL AS NEEDED
Status: DISCONTINUED | OUTPATIENT
Start: 2022-06-13 | End: 2022-06-13 | Stop reason: SURG

## 2022-06-13 RX ORDER — OXYCODONE HYDROCHLORIDE 5 MG/1
10 TABLET ORAL ONCE AS NEEDED
Status: COMPLETED | OUTPATIENT
Start: 2022-06-13 | End: 2022-06-13

## 2022-06-13 RX ORDER — PROMETHAZINE HYDROCHLORIDE 25 MG/1
25 SUPPOSITORY RECTAL ONCE AS NEEDED
Status: DISCONTINUED | OUTPATIENT
Start: 2022-06-13 | End: 2022-06-13 | Stop reason: HOSPADM

## 2022-06-13 RX ORDER — FENTANYL CITRATE 50 UG/ML
INJECTION, SOLUTION INTRAMUSCULAR; INTRAVENOUS AS NEEDED
Status: DISCONTINUED | OUTPATIENT
Start: 2022-06-13 | End: 2022-06-13 | Stop reason: SURG

## 2022-06-13 RX ORDER — LABETALOL HYDROCHLORIDE 5 MG/ML
5 INJECTION, SOLUTION INTRAVENOUS
Status: DISCONTINUED | OUTPATIENT
Start: 2022-06-13 | End: 2022-06-13 | Stop reason: HOSPADM

## 2022-06-13 RX ORDER — SODIUM CHLORIDE 0.9 % (FLUSH) 0.9 %
10 SYRINGE (ML) INJECTION EVERY 12 HOURS SCHEDULED
Status: DISCONTINUED | OUTPATIENT
Start: 2022-06-13 | End: 2022-06-13 | Stop reason: HOSPADM

## 2022-06-13 RX ORDER — ACETAMINOPHEN 325 MG/1
650 TABLET ORAL ONCE AS NEEDED
Status: DISCONTINUED | OUTPATIENT
Start: 2022-06-13 | End: 2022-06-13 | Stop reason: HOSPADM

## 2022-06-13 RX ORDER — ONDANSETRON 2 MG/ML
INJECTION INTRAMUSCULAR; INTRAVENOUS AS NEEDED
Status: DISCONTINUED | OUTPATIENT
Start: 2022-06-13 | End: 2022-06-13 | Stop reason: SURG

## 2022-06-13 RX ORDER — HYDRALAZINE HYDROCHLORIDE 20 MG/ML
5 INJECTION INTRAMUSCULAR; INTRAVENOUS
Status: DISCONTINUED | OUTPATIENT
Start: 2022-06-13 | End: 2022-06-13 | Stop reason: HOSPADM

## 2022-06-13 RX ORDER — PROMETHAZINE HYDROCHLORIDE 25 MG/1
25 TABLET ORAL ONCE AS NEEDED
Status: DISCONTINUED | OUTPATIENT
Start: 2022-06-13 | End: 2022-06-13 | Stop reason: HOSPADM

## 2022-06-13 RX ORDER — HYDROCODONE BITARTRATE AND ACETAMINOPHEN 5; 325 MG/1; MG/1
1-2 TABLET ORAL EVERY 4 HOURS PRN
Qty: 30 TABLET | Refills: 0 | Status: SHIPPED | OUTPATIENT
Start: 2022-06-13 | End: 2022-06-29

## 2022-06-13 RX ORDER — ACETAMINOPHEN 650 MG/1
650 SUPPOSITORY RECTAL ONCE AS NEEDED
Status: DISCONTINUED | OUTPATIENT
Start: 2022-06-13 | End: 2022-06-13 | Stop reason: HOSPADM

## 2022-06-13 RX ORDER — PROPOFOL 10 MG/ML
VIAL (ML) INTRAVENOUS AS NEEDED
Status: DISCONTINUED | OUTPATIENT
Start: 2022-06-13 | End: 2022-06-13 | Stop reason: SURG

## 2022-06-13 RX ORDER — GLYCOPYRROLATE 0.2 MG/ML
INJECTION INTRAMUSCULAR; INTRAVENOUS AS NEEDED
Status: DISCONTINUED | OUTPATIENT
Start: 2022-06-13 | End: 2022-06-13 | Stop reason: SURG

## 2022-06-13 RX ORDER — ROCURONIUM BROMIDE 10 MG/ML
INJECTION, SOLUTION INTRAVENOUS AS NEEDED
Status: DISCONTINUED | OUTPATIENT
Start: 2022-06-13 | End: 2022-06-13 | Stop reason: SURG

## 2022-06-13 RX ORDER — SODIUM CHLORIDE, SODIUM LACTATE, POTASSIUM CHLORIDE, CALCIUM CHLORIDE 600; 310; 30; 20 MG/100ML; MG/100ML; MG/100ML; MG/100ML
9 INJECTION, SOLUTION INTRAVENOUS CONTINUOUS PRN
Status: DISCONTINUED | OUTPATIENT
Start: 2022-06-13 | End: 2022-06-13 | Stop reason: HOSPADM

## 2022-06-13 RX ORDER — BUPIVACAINE HYDROCHLORIDE 5 MG/ML
INJECTION, SOLUTION EPIDURAL; INTRACAUDAL
Status: COMPLETED | OUTPATIENT
Start: 2022-06-13 | End: 2022-06-13

## 2022-06-13 RX ORDER — SODIUM CHLORIDE 9 MG/ML
100 INJECTION, SOLUTION INTRAVENOUS CONTINUOUS
Status: DISCONTINUED | OUTPATIENT
Start: 2022-06-13 | End: 2022-06-13 | Stop reason: HOSPADM

## 2022-06-13 RX ORDER — DEXAMETHASONE SODIUM PHOSPHATE 4 MG/ML
INJECTION, SOLUTION INTRA-ARTICULAR; INTRALESIONAL; INTRAMUSCULAR; INTRAVENOUS; SOFT TISSUE AS NEEDED
Status: DISCONTINUED | OUTPATIENT
Start: 2022-06-13 | End: 2022-06-13 | Stop reason: SURG

## 2022-06-13 RX ORDER — CHLORHEXIDINE GLUCONATE 0.12 MG/ML
15 RINSE ORAL EVERY 12 HOURS SCHEDULED
Status: DISCONTINUED | OUTPATIENT
Start: 2022-06-13 | End: 2022-06-13 | Stop reason: HOSPADM

## 2022-06-13 RX ORDER — OXYCODONE HYDROCHLORIDE 5 MG/1
15 TABLET ORAL EVERY 4 HOURS PRN
Status: DISCONTINUED | OUTPATIENT
Start: 2022-06-13 | End: 2022-06-13 | Stop reason: HOSPADM

## 2022-06-13 RX ORDER — MIDAZOLAM HYDROCHLORIDE 1 MG/ML
0.5 INJECTION INTRAMUSCULAR; INTRAVENOUS
Status: DISCONTINUED | OUTPATIENT
Start: 2022-06-13 | End: 2022-06-13 | Stop reason: HOSPADM

## 2022-06-13 RX ORDER — SODIUM CHLORIDE 0.9 % (FLUSH) 0.9 %
10 SYRINGE (ML) INJECTION AS NEEDED
Status: DISCONTINUED | OUTPATIENT
Start: 2022-06-13 | End: 2022-06-13 | Stop reason: HOSPADM

## 2022-06-13 RX ADMIN — FENTANYL CITRATE 25 MCG: 50 INJECTION, SOLUTION INTRAMUSCULAR; INTRAVENOUS at 14:13

## 2022-06-13 RX ADMIN — GLYCOPYRROLATE 0.2 MG: 0.2 INJECTION INTRAMUSCULAR; INTRAVENOUS at 13:10

## 2022-06-13 RX ADMIN — SODIUM CHLORIDE, POTASSIUM CHLORIDE, SODIUM LACTATE AND CALCIUM CHLORIDE 9 ML/HR: 600; 310; 30; 20 INJECTION, SOLUTION INTRAVENOUS at 11:45

## 2022-06-13 RX ADMIN — BUPIVACAINE HYDROCHLORIDE 10 ML: 5 INJECTION, SOLUTION EPIDURAL; INTRACAUDAL; PERINEURAL at 13:03

## 2022-06-13 RX ADMIN — HYDROMORPHONE HYDROCHLORIDE 0.5 MG: 1 INJECTION, SOLUTION INTRAMUSCULAR; INTRAVENOUS; SUBCUTANEOUS at 14:57

## 2022-06-13 RX ADMIN — FENTANYL CITRATE 25 MCG: 50 INJECTION, SOLUTION INTRAMUSCULAR; INTRAVENOUS at 14:15

## 2022-06-13 RX ADMIN — OXYCODONE 10 MG: 5 TABLET ORAL at 15:08

## 2022-06-13 RX ADMIN — HYDROMORPHONE HYDROCHLORIDE 0.5 MG: 1 INJECTION, SOLUTION INTRAMUSCULAR; INTRAVENOUS; SUBCUTANEOUS at 14:35

## 2022-06-13 RX ADMIN — BUPIVACAINE 10 ML: 13.3 INJECTION, SUSPENSION, LIPOSOMAL INFILTRATION at 13:03

## 2022-06-13 RX ADMIN — ROCURONIUM BROMIDE 50 MG: 50 INJECTION, SOLUTION INTRAVENOUS at 12:59

## 2022-06-13 RX ADMIN — LIDOCAINE HYDROCHLORIDE 50 MG: 10 INJECTION, SOLUTION EPIDURAL; INFILTRATION; INTRACAUDAL; PERINEURAL at 12:58

## 2022-06-13 RX ADMIN — ONDANSETRON 4 MG: 2 INJECTION INTRAMUSCULAR; INTRAVENOUS at 13:58

## 2022-06-13 RX ADMIN — PROPOFOL 150 MG: 10 INJECTION, EMULSION INTRAVENOUS at 12:58

## 2022-06-13 RX ADMIN — DEXAMETHASONE SODIUM PHOSPHATE 4 MG: 4 INJECTION, SOLUTION INTRAMUSCULAR; INTRAVENOUS at 13:06

## 2022-06-13 RX ADMIN — FENTANYL CITRATE 100 MCG: 50 INJECTION, SOLUTION INTRAMUSCULAR; INTRAVENOUS at 12:57

## 2022-06-13 RX ADMIN — FENTANYL CITRATE 50 MCG: 50 INJECTION, SOLUTION INTRAMUSCULAR; INTRAVENOUS at 13:37

## 2022-06-13 RX ADMIN — SUGAMMADEX 200 MG: 100 INJECTION, SOLUTION INTRAVENOUS at 14:13

## 2022-06-13 RX ADMIN — MUPIROCIN: 20 OINTMENT TOPICAL at 11:45

## 2022-06-13 RX ADMIN — CEFAZOLIN 2 G: 2 INJECTION, POWDER, FOR SOLUTION INTRAMUSCULAR; INTRAVENOUS at 13:00

## 2022-06-13 RX ADMIN — CHLORHEXIDINE GLUCONATE 15 ML: 1.2 SOLUTION ORAL at 11:45

## 2022-06-13 RX ADMIN — ROCURONIUM BROMIDE 20 MG: 50 INJECTION, SOLUTION INTRAVENOUS at 13:49

## 2022-06-13 NOTE — ANESTHESIA POSTPROCEDURE EVALUATION
Patient: Reyna Escalona    Procedure Summary     Date: 06/13/22 Room / Location: [facility] OR 06 / [facility] MAIN OR    Anesthesia Start: 1250 Anesthesia Stop: 1426    Procedure: INGUINAL HERNIA REPAIR (Right Groin) Diagnosis:       Recurrent right inguinal hernia      (Recurrent right inguinal hernia [K40.91])    Surgeons: Jareth Curry MD Provider: Juvenal Chavez MD    Anesthesia Type: general with block ASA Status: 3          Anesthesia Type: general with block    Vitals  Vitals Value Taken Time   /77 06/13/22 1453   Temp 97 °F (36.1 °C) 06/13/22 1423   Pulse 69 06/13/22 1453   Resp 13 06/13/22 1453   SpO2 95 % 06/13/22 1453           Post Anesthesia Care and Evaluation    Patient location during evaluation: PACU  Patient participation: complete - patient participated  Level of consciousness: awake  Pain scale: See nurse's notes for pain score.  Pain management: adequate    Airway patency: patent  Anesthetic complications: No anesthetic complications  PONV Status: none  Cardiovascular status: acceptable  Respiratory status: acceptable  Hydration status: acceptable    Comments: Patient seen and examined postoperatively; vital signs stable; SpO2 greater than or equal to 90%; cardiopulmonary status stable; nausea/vomiting adequately controlled; pain adequately controlled; no apparent anesthesia complications; patient discharged from anesthesia care when discharge criteria were met

## 2022-06-13 NOTE — ANESTHESIA PREPROCEDURE EVALUATION
Anesthesia Evaluation     Patient summary reviewed and Nursing notes reviewed   history of anesthetic complications: PONV  NPO Solid Status: > 8 hours  NPO Liquid Status: > 8 hours           Airway   Dental      Pulmonary    (+) a smoker Former, COPD,   Cardiovascular     ECG reviewed  Patient on routine beta blocker    (+) hypertension, dysrhythmias PAC, hyperlipidemia,       Neuro/Psych  GI/Hepatic/Renal/Endo    (+) obesity,   renal disease ARF, CRI and stones, diabetes mellitus,     Musculoskeletal     (+) back pain, chronic pain,   Abdominal    Substance History      OB/GYN          Other      history of cancer    ROS/Med Hx Other: Prostate cancer, hyperglycemia, inguinal hernia, agitation, abd/flank pain, spinal stenosis, emphysema, nausea    PSH  BACK SURGERY HERNIA REPAIR  JOINT REPLACEMENT ROTATOR CUFF REPAIR  KIDNEY STONE SURGERY TUMOR REMOVAL  CYSTOSCOPY, URETEROSCOPY, RETROGRADE PYELOGRAM, STENT INSERTION                 Anesthesia Plan    ASA 3     general with block     (Patient identified; pre-operative vital signs, all relevant labs/studies, complete medical/surgical/anesthetic history, full medication list, full allergy list, and NPO status obtained/reviewed; physical assessment performed; anesthetic options, side effects, potential complications, risks, and benefits discussed; questions answered; written anesthesia consent obtained; patient cleared for procedure; anesthesia machine and equipment checked and functioning)  intravenous induction     Anesthetic plan, risks, benefits, and alternatives have been provided, discussed and informed consent has been obtained with: patient.    Plan discussed with CRNA and CAA.        CODE STATUS:

## 2022-06-13 NOTE — ANESTHESIA PROCEDURE NOTES
Airway  Urgency: elective    Date/Time: 6/13/2022 1:00 PM  Airway not difficult    General Information and Staff    Patient location during procedure: OR  CRNA/CAA: Faisal Chavez CAA    Indications and Patient Condition  Indications for airway management: airway protection    Preoxygenated: yes  Mask difficulty assessment: 2 - vent by mask + OA or adjuvant +/- NMBA    Final Airway Details  Final airway type: endotracheal airway      Successful airway: ETT  Cuffed: yes   Successful intubation technique: direct laryngoscopy  Endotracheal tube insertion site: oral  Blade: Audrey  Blade size: 3  ETT size (mm): 7.5  Cormack-Lehane Classification: grade I - full view of glottis  Placement verified by: chest auscultation and capnometry   Measured from: lips  ETT/EBT  to lips (cm): 22  Number of attempts at approach: 1  Assessment: lips, teeth, and gum same as pre-op and atraumatic intubation

## 2022-06-13 NOTE — OP NOTE
INGUINAL HERNIA REPAIR  Operative Note    Patient Name:  Reyna Escalona  YOB: 1945    Date of Surgery:  6/13/2022     Indications: The patient is a 77 y.o. gentleman who presents with a symptomatic recurrent inguinal hernia on the right side.  We discussed the risk, benefits, and alternatives to surgery and the patient agreed to proceed to the operating room for open inguinal hernia repair with mesh.    Pre-op Diagnosis:   Recurrent right inguinal hernia [K40.91]    Post-op Diagnosis:  Post-Op Diagnosis Codes:     * Recurrent right inguinal hernia [K40.91]    Procedure/CPT® Codes:      Procedure(s):  INGUINAL HERNIA REPAIR    Staff:  Surgeon(s):  Jareth Curry MD    Assistant: Doe Gonsales CSA was responsible for performing the following activities: Retraction, Suction and Closing and their skilled assistance was necessary for the success of this case.     Anesthesia: General    Estimated Blood Loss: minimal    Implants:    Implant Name Type Inv. Item Serial No.  Lot No. LRB No. Used Action   MESH FLUT SHT 3X6IN - [id number] Implant MESH FLUT SHT 3X6IN  [employer] [id number] Right 1 Implanted       Specimen:          Specimens     ID Source Type Tests Collected By Collected At Frozen?    A Hernia, Sac Tissue · TISSUE PATHOLOGY EXAM   Jareth Curry MD 6/13/22 1338 No    Description: right inquinal hernia sac          Findings: large communicating hydrocele with indirect and small direct inguinal hernia    Complications: none, immediately    Description of Procedure: After obtaining informed consent in the preop holding area the patient was brought to the operating room and placed in the supine position.  SCDs were applied, and preoperative antibiotics were administered.  The patient then underwent uncomplicated induction of general endotracheal anesthesia. We began by prepping and draping the right  groin in the usual sterile fashion.  After a brief  timeout the procedure began.  I began by making a transverse incision approximately 2 cm above the inguinal ligament.  I then dissected down through the subcutaneous fat layers to the external oblique aponeurosis.  The external oblique aponeurosis was then opened along its fibers taking care to not injure the ilioinguinal nerve which was running deep to the layer.  We are then able to encircle the spermatic cord at the level of the pubic tubercle.  A Penrose drain was used to control the spermatic cord.  There was an indirect hernia sac which was running along the spermatic cord, and this was dissected free using a combination of blunt dissection and electrocautery.  Once freed to the level of the internal ring, the hernia sac was then opened, and its contents examined.  I then performed a high ligation using 2-0 Vicryl suture.  This allowed for adequate reduction of the hernia sac into the abdomen. After performing my high ligation of the hernia sac, I turned my attention towards reinforcement of the inguinal floor. There was noted to be a direct inguinal hernia.  A piece of mesh was selected and cut into a surfboard fashion with the lateral edge of the mesh split to wrap around the spermatic cord.  I then secured the mesh to the pubic tubercle medially, the conjoined tendon superiorly, and the inguinal ligament inferiorly using 0 Ethibond suture in an interrupted fashion.  The tails were then wrapped around the spermatic cord and secured again using 0 Ethibond suture.  We then tested our repair by performing a Valsalva maneuver, there was no evidence of any recurrent hernia.  The external oblique aponeurosis was then reapproximated using running 2-0 Vicryl suture, again taking care not to damage the ilioinguinal nerve running deep to this layer.  Albertina's fascia was reapproximated using 3-0 Vicryl suture in an interrupted fashion.  The wound was closed using 4-0 Vicryl, and dressed using skin affix skin glue.  The  patient tolerated the procedure well and remained in the operating room for a CONSTANZA block.    Jareth Curry MD     Date: 6/13/2022  Time: 14:11 EDT

## 2022-06-13 NOTE — ANESTHESIA PROCEDURE NOTES
Peripheral Block    Pre-sedation assessment completed: 6/13/2022 12:30 PM    Patient reassessed immediately prior to procedure    Patient location during procedure: OR  Reason for block: procedure for pain, at surgeon's request, post-op pain management and secondary anesthetic  Performed by  Anesthesiologist: Abhijeet Ford MD  Preanesthetic Checklist  Completed: patient identified, IV checked, site marked, risks and benefits discussed, surgical consent, monitors and equipment checked, pre-op evaluation and timeout performed  Prep:  Pt Position: supine  Sterile barriers:cap, gloves, mask and washed/disinfected hands  Prep: ChloraPrep  Patient monitoring: blood pressure monitoring, continuous pulse oximetry and EKG  Procedure  Performed under: general  Guidance:ultrasound guided and landmark technique    ULTRASOUND INTERPRETATION. Using ultrasound guidance a 21 G gauge needle was placed in close proximity to the nerve, at which point, under ultrasound guidance anesthetic was injected in the area of the nerve and spread of the anesthesia was seen on ultrasound in close proximity thereto.  There were no abnormalities seen on ultrasound; a digital image was taken; and the patient tolerated the procedure with no complications. Images:still images obtained, printed/placed on chart    Laterality:right  Block Type:TAP  Injection Technique:single-shot  Needle Type:echogenic  Needle Gauge:21 G  Resistance on Injection: less than 15 psi    Medications Used: bupivacaine PF (MARCAINE) injection 0.5%, 10 mL  bupivacaine liposome (EXPAREL) injection 1.3%, 10 mL  Med administered at 6/13/2022 1:03 PM      Medications  Preservative Free Saline:10ml    Post Assessment  Injection Assessment: negative aspiration for heme, no paresthesia on injection and incremental injection  Patient Tolerance:comfortable throughout block  Complications:no  Additional Notes  Pre-procedure:  TAP block performed during anesthesia time  intraoperatively in the OR after induction at the request of the patient and the surgeon for the management of postoperative acute surgical pain as well as for a secondary intraoperative anesthetic (general anesthesia is the primary intraoperative anesthetic); patient identified; pre-procedure vital signs, all relevant labs/studies, complete medical/surgical/anesthetic history, full medication list, full allergy list, and NPO status obtained/reviewed; physical assessment performed; anesthetic options, side effects, potential complications, risks, and benefits discussed; questions answered; patient wishes to proceed with the procedure; written anesthesia procedure consent obtained; patient cleared for procedure; time out performed; IV access in situ    Procedure:  ASA monitor placed; supplemental oxygen provided; patient under general anesthesia; patient positioned; hand hygiene performed; sterile technique maintained throughout the procedure; sterile prep applied; insertion site determined by anatomical landmarks, palpation, and ultrasound imaging; live ultrasound guidance throughout the procedure; target nerves/landmarks identified on live ultrasound; 110 mm 21G Sono TAP Cannula Needle used; realtime needle advancement and placement in the target anatomic plane witnessed on live ultrasound; negative aspiration prior to injection; correct needle placement first confirmed on live ultrasound by injection of normal saline in the correct anatomic plane; negative aspiration prior to injection of local anesthetic; correct needle placement confirmed on live ultrasound by local anesthetic spread in the correct anatomic plane; local anesthetic mixture injected with negative aspiration prior to each injection and after each 1-5 mL injected; needle withdrawn; ultrasound image printed and placed in the patient's permanent medical record    Post-procedure:  TAP block placed successfully; good block; no apparent complications;  minimal estimated blood loss; vital signs stable throughout; surgery started

## 2022-06-13 NOTE — DISCHARGE INSTRUCTIONS
Ok for discharge  Follow-up with me (Dr. Curry) in 2 weeks  Regular diet as tolerated  Ok to shower starting tomorrow. No tub baths, pools, lakes, or streams for 1 week.  Ok to apply ice to incisions  No heavy lifting (greater than 20 lbs) for 6 weeks after surgery  Call my office or present to the ED with: fevers greater than 101.5, redness around the incisions, drainage from the incisions, intractable nausea/vomiting, or pain that is getting worse instead of better

## 2022-06-15 LAB
LAB AP CASE REPORT: NORMAL
PATH REPORT.FINAL DX SPEC: NORMAL
PATH REPORT.GROSS SPEC: NORMAL

## 2022-06-17 ENCOUNTER — TELEPHONE (OUTPATIENT)
Dept: SURGERY | Facility: CLINIC | Age: 77
End: 2022-06-17

## 2022-06-17 NOTE — TELEPHONE ENCOUNTER
Caller: JOSÉ LUIS NOLAN    Relationship to patient: Emergency Contact    Best call back number: [phone]    Patient is needing: PT WIFE CALLED TO SAY PT HAS A RED STREAK DOWN HIS RIGHT SIDE AFTER THE PROCEDURE W/ DR BARRY ON 6/13.    ATTEMPTED TO TRANSFER; NO ANSWER AT PRACTICE    PT WIFE CAN BE REACHED ANYTIME. IF NO ANSWER ON HOME NUMBER PLEASE CALL CELL [phone]; OKAY TO LEAVE MESSAGE IF NO ANSWER

## 2022-06-17 NOTE — TELEPHONE ENCOUNTER
Spoke with wilman. Asked her if the line was connected to the inc. She inspected pt and stated it actually was a bruise. Informed to keep an eye on it and to let us know if anything changes.

## 2022-06-29 ENCOUNTER — OFFICE VISIT (OUTPATIENT)
Dept: SURGERY | Facility: CLINIC | Age: 77
End: 2022-06-29

## 2022-06-29 VITALS
SYSTOLIC BLOOD PRESSURE: 161 MMHG | TEMPERATURE: 98.2 F | RESPIRATION RATE: 18 BRPM | HEIGHT: 71 IN | DIASTOLIC BLOOD PRESSURE: 76 MMHG | OXYGEN SATURATION: 97 % | BODY MASS INDEX: 35.73 KG/M2 | WEIGHT: 255.2 LBS | HEART RATE: 59 BPM

## 2022-06-29 DIAGNOSIS — K40.91 RECURRENT RIGHT INGUINAL HERNIA: Primary | ICD-10-CM

## 2022-06-29 PROCEDURE — 99024 POSTOP FOLLOW-UP VISIT: CPT | Performed by: SURGERY

## 2022-06-30 NOTE — PROGRESS NOTES
"Post-op Note    Subjective   Reyna Escalona is a 77 y.o. male status post open recurrent right inguinal hernia repair with mesh performed on 6/13/2022.  Overall, the patient is doing well.  He denies having any significant pain.  He is able to walk without difficulties, and reports only occasional tugging sensations on the medial aspect of his incision.      Objective   /76 (BP Location: Right arm, Patient Position: Sitting, Cuff Size: Large Adult)   Pulse 59   Temp 98.2 °F (36.8 °C) (Infrared)   Resp 18   Ht 180.3 cm (71\")   Wt 116 kg (255 lb 3.2 oz)   SpO2 97%   BMI 35.59 kg/m²   Right inguinal hernia incision is well approximated without any surrounding erythema, duration, or drainage to suggest infection.  Normal amount of postoperative induration is present beneath the incision.  No evidence of recurrent right inguinal hernia.      Assessment & Plan   Patient is a 77-year-old gentleman status post open recurrent right inguinal hernia repair with mesh performed on 6/13/2022.  Overall he appears to be recovering as expected.    No heavy lifting for 6 weeks after surgery, then activities as tolerated  Regular diet as tolerated  Follow-up with me as needed    Jareth Curry MD  6/30/2022  14:43 EDT  "

## 2022-07-27 PROCEDURE — 87070 CULTURE OTHR SPECIMN AEROBIC: CPT | Performed by: FAMILY MEDICINE

## 2022-07-27 PROCEDURE — 87205 SMEAR GRAM STAIN: CPT | Performed by: FAMILY MEDICINE

## 2022-07-27 PROCEDURE — 87185 SC STD ENZYME DETCJ PER NZM: CPT | Performed by: FAMILY MEDICINE

## 2022-08-09 ENCOUNTER — TRANSCRIBE ORDERS (OUTPATIENT)
Dept: ADMINISTRATIVE | Facility: HOSPITAL | Age: 77
End: 2022-08-09

## 2022-08-09 ENCOUNTER — LAB (OUTPATIENT)
Dept: LAB | Facility: HOSPITAL | Age: 77
End: 2022-08-09

## 2022-08-09 DIAGNOSIS — N20.1 LEFT URETERAL STONE: Primary | ICD-10-CM

## 2022-08-09 DIAGNOSIS — N20.1 LEFT URETERAL STONE: ICD-10-CM

## 2022-08-09 LAB
ANION GAP SERPL CALCULATED.3IONS-SCNC: 17.1 MMOL/L (ref 5–15)
BASOPHILS # BLD AUTO: 0.02 10*3/MM3 (ref 0–0.2)
BASOPHILS NFR BLD AUTO: 0.3 % (ref 0–1.5)
BUN SERPL-MCNC: 25 MG/DL (ref 8–23)
BUN/CREAT SERPL: 14.7 (ref 7–25)
CALCIUM SPEC-SCNC: 10.2 MG/DL (ref 8.6–10.5)
CHLORIDE SERPL-SCNC: 100 MMOL/L (ref 98–107)
CO2 SERPL-SCNC: 22.9 MMOL/L (ref 22–29)
CREAT SERPL-MCNC: 1.7 MG/DL (ref 0.76–1.27)
DEPRECATED RDW RBC AUTO: 42.9 FL (ref 37–54)
EGFRCR SERPLBLD CKD-EPI 2021: 41 ML/MIN/1.73
EOSINOPHIL # BLD AUTO: 0.16 10*3/MM3 (ref 0–0.4)
EOSINOPHIL NFR BLD AUTO: 2.1 % (ref 0.3–6.2)
ERYTHROCYTE [DISTWIDTH] IN BLOOD BY AUTOMATED COUNT: 14.1 % (ref 12.3–15.4)
GLUCOSE SERPL-MCNC: 113 MG/DL (ref 65–99)
HCT VFR BLD AUTO: 37.3 % (ref 37.5–51)
HGB BLD-MCNC: 12.5 G/DL (ref 13–17.7)
IMM GRANULOCYTES # BLD AUTO: 0.13 10*3/MM3 (ref 0–0.05)
IMM GRANULOCYTES NFR BLD AUTO: 1.7 % (ref 0–0.5)
LYMPHOCYTES # BLD AUTO: 1.37 10*3/MM3 (ref 0.7–3.1)
LYMPHOCYTES NFR BLD AUTO: 18 % (ref 19.6–45.3)
MCH RBC QN AUTO: 28.2 PG (ref 26.6–33)
MCHC RBC AUTO-ENTMCNC: 33.5 G/DL (ref 31.5–35.7)
MCV RBC AUTO: 84.2 FL (ref 79–97)
MONOCYTES # BLD AUTO: 0.55 10*3/MM3 (ref 0.1–0.9)
MONOCYTES NFR BLD AUTO: 7.2 % (ref 5–12)
NEUTROPHILS NFR BLD AUTO: 5.37 10*3/MM3 (ref 1.7–7)
NEUTROPHILS NFR BLD AUTO: 70.7 % (ref 42.7–76)
NRBC BLD AUTO-RTO: 0 /100 WBC (ref 0–0.2)
PLATELET # BLD AUTO: 211 10*3/MM3 (ref 140–450)
PMV BLD AUTO: 11.7 FL (ref 6–12)
POTASSIUM SERPL-SCNC: 4.6 MMOL/L (ref 3.5–5.2)
RBC # BLD AUTO: 4.43 10*6/MM3 (ref 4.14–5.8)
SODIUM SERPL-SCNC: 140 MMOL/L (ref 136–145)
WBC NRBC COR # BLD: 7.6 10*3/MM3 (ref 3.4–10.8)

## 2022-08-09 PROCEDURE — 80048 BASIC METABOLIC PNL TOTAL CA: CPT

## 2022-08-09 PROCEDURE — 36415 COLL VENOUS BLD VENIPUNCTURE: CPT

## 2022-08-09 PROCEDURE — 85025 COMPLETE CBC W/AUTO DIFF WBC: CPT

## 2022-08-14 PROCEDURE — U0004 COV-19 TEST NON-CDC HGH THRU: HCPCS | Performed by: NURSE PRACTITIONER

## 2022-09-07 ENCOUNTER — HOSPITAL ENCOUNTER (EMERGENCY)
Facility: HOSPITAL | Age: 77
End: 2022-09-07

## 2022-09-08 ENCOUNTER — LAB REQUISITION (OUTPATIENT)
Dept: LAB | Facility: HOSPITAL | Age: 77
End: 2022-09-08

## 2022-09-08 DIAGNOSIS — N13.2 HYDRONEPHROSIS WITH RENAL AND URETERAL CALCULOUS OBSTRUCTION: ICD-10-CM

## 2022-09-08 PROCEDURE — 82365 CALCULUS SPECTROSCOPY: CPT | Performed by: UROLOGY

## 2022-09-14 LAB
COLOR STONE: NORMAL
COM MFR STONE: 10 %
COMPN STONE: NORMAL
LABORATORY COMMENT REPORT: NORMAL
LABORATORY COMMENT REPORT: NORMAL
Lab: NORMAL
Lab: NORMAL
PHOTO: NORMAL
SIZE STONE: NORMAL MM
SPEC SOURCE SUBJ: NORMAL
URATE MFR STONE: 90 %
WT STONE: 16 MG

## 2023-01-06 ENCOUNTER — TRANSCRIBE ORDERS (OUTPATIENT)
Dept: ADMINISTRATIVE | Facility: HOSPITAL | Age: 78
End: 2023-01-06
Payer: MEDICARE

## 2023-01-06 ENCOUNTER — LAB (OUTPATIENT)
Dept: LAB | Facility: HOSPITAL | Age: 78
End: 2023-01-06
Payer: MEDICARE

## 2023-01-06 ENCOUNTER — HOSPITAL ENCOUNTER (OUTPATIENT)
Dept: CARDIOLOGY | Facility: HOSPITAL | Age: 78
Discharge: HOME | End: 2023-01-06
Payer: MEDICARE

## 2023-01-06 DIAGNOSIS — N20.1 CALCULUS OF URETER: ICD-10-CM

## 2023-01-06 DIAGNOSIS — N20.1 CALCULUS OF URETER: Primary | ICD-10-CM

## 2023-01-06 DIAGNOSIS — Z01.818 OTHER SPECIFIED PRE-OPERATIVE EXAMINATION: ICD-10-CM

## 2023-01-06 LAB
ANION GAP SERPL CALCULATED.3IONS-SCNC: 12.1 MMOL/L (ref 5–15)
BASOPHILS # BLD AUTO: 0.02 10*3/MM3 (ref 0–0.2)
BASOPHILS NFR BLD AUTO: 0.3 % (ref 0–1.5)
BUN SERPL-MCNC: 38 MG/DL (ref 8–23)
BUN/CREAT SERPL: 16 (ref 7–25)
CALCIUM SPEC-SCNC: 9.8 MG/DL (ref 8.6–10.5)
CHLORIDE SERPL-SCNC: 102 MMOL/L (ref 98–107)
CO2 SERPL-SCNC: 24.9 MMOL/L (ref 22–29)
CREAT SERPL-MCNC: 2.38 MG/DL (ref 0.76–1.27)
DEPRECATED RDW RBC AUTO: 39.7 FL (ref 37–54)
EGFRCR SERPLBLD CKD-EPI 2021: 27.4 ML/MIN/1.73
EOSINOPHIL # BLD AUTO: 0.09 10*3/MM3 (ref 0–0.4)
EOSINOPHIL NFR BLD AUTO: 1.5 % (ref 0.3–6.2)
ERYTHROCYTE [DISTWIDTH] IN BLOOD BY AUTOMATED COUNT: 13 % (ref 12.3–15.4)
GLUCOSE SERPL-MCNC: 119 MG/DL (ref 65–99)
HCT VFR BLD AUTO: 35.9 % (ref 37.5–51)
HGB BLD-MCNC: 11.9 G/DL (ref 13–17.7)
IMM GRANULOCYTES # BLD AUTO: 0.07 10*3/MM3 (ref 0–0.05)
IMM GRANULOCYTES NFR BLD AUTO: 1.2 % (ref 0–0.5)
LYMPHOCYTES # BLD AUTO: 0.94 10*3/MM3 (ref 0.7–3.1)
LYMPHOCYTES NFR BLD AUTO: 15.7 % (ref 19.6–45.3)
MCH RBC QN AUTO: 27.9 PG (ref 26.6–33)
MCHC RBC AUTO-ENTMCNC: 33.1 G/DL (ref 31.5–35.7)
MCV RBC AUTO: 84.3 FL (ref 79–97)
MONOCYTES # BLD AUTO: 0.48 10*3/MM3 (ref 0.1–0.9)
MONOCYTES NFR BLD AUTO: 8 % (ref 5–12)
NEUTROPHILS NFR BLD AUTO: 4.37 10*3/MM3 (ref 1.7–7)
NEUTROPHILS NFR BLD AUTO: 73.3 % (ref 42.7–76)
NRBC BLD AUTO-RTO: 0 /100 WBC (ref 0–0.2)
PLATELET # BLD AUTO: 217 10*3/MM3 (ref 140–450)
PMV BLD AUTO: 11.3 FL (ref 6–12)
POTASSIUM SERPL-SCNC: 4.3 MMOL/L (ref 3.5–5.2)
RBC # BLD AUTO: 4.26 10*6/MM3 (ref 4.14–5.8)
SODIUM SERPL-SCNC: 139 MMOL/L (ref 136–145)
WBC NRBC COR # BLD: 5.97 10*3/MM3 (ref 3.4–10.8)

## 2023-01-06 PROCEDURE — 36415 COLL VENOUS BLD VENIPUNCTURE: CPT

## 2023-01-06 PROCEDURE — 80048 BASIC METABOLIC PNL TOTAL CA: CPT

## 2023-01-06 PROCEDURE — 85025 COMPLETE CBC W/AUTO DIFF WBC: CPT

## 2023-01-06 PROCEDURE — 93005 ELECTROCARDIOGRAM TRACING: CPT | Performed by: UROLOGY

## 2023-01-06 PROCEDURE — 93010 ELECTROCARDIOGRAM REPORT: CPT | Performed by: INTERNAL MEDICINE

## 2023-01-11 LAB — QT INTERVAL: 374 MS

## 2023-01-30 ENCOUNTER — LAB REQUISITION (OUTPATIENT)
Dept: LAB | Facility: HOSPITAL | Age: 78
End: 2023-01-30
Payer: MEDICARE

## 2023-01-30 DIAGNOSIS — N20.1 CALCULUS OF URETER: ICD-10-CM

## 2023-01-30 PROCEDURE — 82365 CALCULUS SPECTROSCOPY: CPT | Performed by: UROLOGY

## 2023-02-02 LAB
CALCIUM OXALATE DIHYDRATE MFR STONE IR: 100 %
COLOR STONE: NORMAL
COMPN STONE: NORMAL
LABORATORY COMMENT REPORT: NORMAL
Lab: NORMAL
Lab: NORMAL
PHOTO: NORMAL
SIZE STONE: NORMAL MM
SPEC SOURCE SUBJ: NORMAL
WT STONE: 12 MG

## 2023-08-03 ENCOUNTER — LAB (OUTPATIENT)
Dept: LAB | Facility: HOSPITAL | Age: 78
End: 2023-08-03
Payer: MEDICARE

## 2023-08-03 ENCOUNTER — TRANSCRIBE ORDERS (OUTPATIENT)
Dept: LAB | Facility: HOSPITAL | Age: 78
End: 2023-08-03
Payer: MEDICARE

## 2023-08-03 DIAGNOSIS — R80.9 PROTEINURIA, UNSPECIFIED TYPE: ICD-10-CM

## 2023-08-03 DIAGNOSIS — N18.6 TYPE 2 DIABETES MELLITUS WITH ESRD (END-STAGE RENAL DISEASE): ICD-10-CM

## 2023-08-03 DIAGNOSIS — E11.22 TYPE 2 DIABETES MELLITUS WITH ESRD (END-STAGE RENAL DISEASE): ICD-10-CM

## 2023-08-03 DIAGNOSIS — N18.32 CHRONIC KIDNEY DISEASE (CKD) STAGE G3B/A1, MODERATELY DECREASED GLOMERULAR FILTRATION RATE (GFR) BETWEEN 30-44 ML/MIN/1.73 SQUARE METER AND ALBUMINURIA CREATININE RATIO LESS THAN 30 MG/G (CMS/H*: ICD-10-CM

## 2023-08-03 DIAGNOSIS — M15.0 PRIMARY GENERALIZED HYPERTROPHIC OSTEOARTHROSIS: ICD-10-CM

## 2023-08-03 DIAGNOSIS — N18.32 CHRONIC KIDNEY DISEASE (CKD) STAGE G3B/A1, MODERATELY DECREASED GLOMERULAR FILTRATION RATE (GFR) BETWEEN 30-44 ML/MIN/1.73 SQUARE METER AND ALBUMINURIA CREATININE RATIO LESS THAN 30 MG/G (CMS/H*: Primary | ICD-10-CM

## 2023-08-03 DIAGNOSIS — I12.9 HYPERTENSIVE NEPHROPATHY: ICD-10-CM

## 2023-08-03 LAB
25(OH)D3 SERPL-MCNC: 28.4 NG/ML (ref 30–100)
ALBUMIN SERPL-MCNC: 4.9 G/DL (ref 3.5–5.2)
ALBUMIN UR-MCNC: 13.8 MG/DL
ANION GAP SERPL CALCULATED.3IONS-SCNC: 12 MMOL/L (ref 5–15)
BACTERIA UR QL AUTO: NORMAL /HPF
BILIRUB UR QL STRIP: NEGATIVE
BUN SERPL-MCNC: 34 MG/DL (ref 8–23)
BUN/CREAT SERPL: 15.7 (ref 7–25)
CALCIUM SPEC-SCNC: 10 MG/DL (ref 8.6–10.5)
CHLORIDE SERPL-SCNC: 103 MMOL/L (ref 98–107)
CLARITY UR: CLEAR
CO2 SERPL-SCNC: 23 MMOL/L (ref 22–29)
COLOR UR: YELLOW
CREAT SERPL-MCNC: 2.17 MG/DL (ref 0.76–1.27)
CREAT UR-MCNC: 120.9 MG/DL
EGFRCR SERPLBLD CKD-EPI 2021: 30.4 ML/MIN/1.73
GLUCOSE SERPL-MCNC: 98 MG/DL (ref 65–99)
GLUCOSE UR STRIP-MCNC: NEGATIVE MG/DL
HGB UR QL STRIP.AUTO: NEGATIVE
HYALINE CASTS UR QL AUTO: NORMAL /LPF
KETONES UR QL STRIP: NEGATIVE
LEUKOCYTE ESTERASE UR QL STRIP.AUTO: NEGATIVE
MICROALBUMIN/CREAT UR: 114.1 MG/G
NITRITE UR QL STRIP: NEGATIVE
PH UR STRIP.AUTO: 5.5 [PH] (ref 5–8)
PHOSPHATE SERPL-MCNC: 3.4 MG/DL (ref 2.5–4.5)
POTASSIUM SERPL-SCNC: 4.4 MMOL/L (ref 3.5–5.2)
PROT UR QL STRIP: ABNORMAL
PTH-INTACT SERPL-MCNC: 54.6 PG/ML (ref 15–65)
RBC # UR STRIP: NORMAL /HPF
REF LAB TEST METHOD: NORMAL
SODIUM SERPL-SCNC: 138 MMOL/L (ref 136–145)
SP GR UR STRIP: 1.02 (ref 1–1.03)
SQUAMOUS #/AREA URNS HPF: NORMAL /HPF
UROBILINOGEN UR QL STRIP: ABNORMAL
WBC # UR STRIP: NORMAL /HPF

## 2023-08-03 PROCEDURE — 82306 VITAMIN D 25 HYDROXY: CPT

## 2023-08-03 PROCEDURE — 82043 UR ALBUMIN QUANTITATIVE: CPT

## 2023-08-03 PROCEDURE — 80069 RENAL FUNCTION PANEL: CPT

## 2023-08-03 PROCEDURE — 84155 ASSAY OF PROTEIN SERUM: CPT

## 2023-08-03 PROCEDURE — 36415 COLL VENOUS BLD VENIPUNCTURE: CPT

## 2023-08-03 PROCEDURE — 84165 PROTEIN E-PHORESIS SERUM: CPT

## 2023-08-03 PROCEDURE — 82570 ASSAY OF URINE CREATININE: CPT

## 2023-08-03 PROCEDURE — 81001 URINALYSIS AUTO W/SCOPE: CPT

## 2023-08-03 PROCEDURE — 83970 ASSAY OF PARATHORMONE: CPT

## 2023-08-07 LAB
ALBUMIN SERPL ELPH-MCNC: 4 G/DL (ref 2.9–4.4)
ALBUMIN/GLOB SERPL: 1.7 {RATIO} (ref 0.7–1.7)
ALPHA1 GLOB SERPL ELPH-MCNC: 0.2 G/DL (ref 0–0.4)
ALPHA2 GLOB SERPL ELPH-MCNC: 0.7 G/DL (ref 0.4–1)
B-GLOBULIN SERPL ELPH-MCNC: 0.9 G/DL (ref 0.7–1.3)
GAMMA GLOB SERPL ELPH-MCNC: 0.6 G/DL (ref 0.4–1.8)
GLOBULIN SER CALC-MCNC: 2.4 G/DL (ref 2.2–3.9)
LABORATORY COMMENT REPORT: NORMAL
M PROTEIN SERPL ELPH-MCNC: NORMAL G/DL
PROT SERPL-MCNC: 6.4 G/DL (ref 6–8.5)

## 2025-08-15 ENCOUNTER — HOSPITAL ENCOUNTER (OUTPATIENT)
Dept: CARDIOLOGY | Facility: HOSPITAL | Age: 80
Discharge: HOME | End: 2025-08-15
Payer: MEDICARE

## 2025-08-15 ENCOUNTER — LAB (OUTPATIENT)
Dept: LAB | Facility: HOSPITAL | Age: 80
End: 2025-08-15
Payer: MEDICARE

## 2025-08-15 ENCOUNTER — TRANSCRIBE ORDERS (OUTPATIENT)
Dept: ADMINISTRATIVE | Facility: HOSPITAL | Age: 80
End: 2025-08-15
Payer: MEDICARE

## 2025-08-15 DIAGNOSIS — Z01.818 PRE-OP TESTING: ICD-10-CM

## 2025-08-15 DIAGNOSIS — Z01.818 PRE-OP TESTING: Primary | ICD-10-CM

## 2025-08-15 LAB
ANION GAP SERPL CALCULATED.3IONS-SCNC: 9.7 MMOL/L (ref 5–15)
BASOPHILS # BLD AUTO: 0.01 10*3/MM3 (ref 0–0.2)
BASOPHILS NFR BLD AUTO: 0.2 % (ref 0–1.5)
BUN SERPL-MCNC: 47.6 MG/DL (ref 8–23)
BUN/CREAT SERPL: 17.8 (ref 7–25)
CALCIUM SPEC-SCNC: 9.5 MG/DL (ref 8.6–10.5)
CHLORIDE SERPL-SCNC: 107 MMOL/L (ref 98–107)
CO2 SERPL-SCNC: 21.3 MMOL/L (ref 22–29)
CREAT SERPL-MCNC: 2.67 MG/DL (ref 0.76–1.27)
DEPRECATED RDW RBC AUTO: 43.8 FL (ref 37–54)
EGFRCR SERPLBLD CKD-EPI 2021: 23.4 ML/MIN/1.73
EOSINOPHIL # BLD AUTO: 0.07 10*3/MM3 (ref 0–0.4)
EOSINOPHIL NFR BLD AUTO: 1.3 % (ref 0.3–6.2)
ERYTHROCYTE [DISTWIDTH] IN BLOOD BY AUTOMATED COUNT: 13.2 % (ref 12.3–15.4)
GLUCOSE SERPL-MCNC: 215 MG/DL (ref 65–99)
HCT VFR BLD AUTO: 32.7 % (ref 37.5–51)
HGB BLD-MCNC: 10.1 G/DL (ref 13–17.7)
IMM GRANULOCYTES # BLD AUTO: 0.07 10*3/MM3 (ref 0–0.05)
IMM GRANULOCYTES NFR BLD AUTO: 1.3 % (ref 0–0.5)
LYMPHOCYTES # BLD AUTO: 0.99 10*3/MM3 (ref 0.7–3.1)
LYMPHOCYTES NFR BLD AUTO: 18.1 % (ref 19.6–45.3)
MCH RBC QN AUTO: 27.9 PG (ref 26.6–33)
MCHC RBC AUTO-ENTMCNC: 30.9 G/DL (ref 31.5–35.7)
MCV RBC AUTO: 90.3 FL (ref 79–97)
MONOCYTES # BLD AUTO: 0.36 10*3/MM3 (ref 0.1–0.9)
MONOCYTES NFR BLD AUTO: 6.6 % (ref 5–12)
NEUTROPHILS NFR BLD AUTO: 3.97 10*3/MM3 (ref 1.7–7)
NEUTROPHILS NFR BLD AUTO: 72.5 % (ref 42.7–76)
NRBC BLD AUTO-RTO: 0 /100 WBC (ref 0–0.2)
PLATELET # BLD AUTO: 163 10*3/MM3 (ref 140–450)
PMV BLD AUTO: 11.8 FL (ref 6–12)
POTASSIUM SERPL-SCNC: 5.3 MMOL/L (ref 3.5–5.2)
RBC # BLD AUTO: 3.62 10*6/MM3 (ref 4.14–5.8)
SODIUM SERPL-SCNC: 138 MMOL/L (ref 136–145)
WBC NRBC COR # BLD AUTO: 5.47 10*3/MM3 (ref 3.4–10.8)

## 2025-08-15 PROCEDURE — 36415 COLL VENOUS BLD VENIPUNCTURE: CPT

## 2025-08-15 PROCEDURE — 85025 COMPLETE CBC W/AUTO DIFF WBC: CPT

## 2025-08-15 PROCEDURE — 80048 BASIC METABOLIC PNL TOTAL CA: CPT

## 2025-08-15 PROCEDURE — 93005 ELECTROCARDIOGRAM TRACING: CPT | Performed by: ORTHOPAEDIC SURGERY

## 2025-08-16 LAB
QT INTERVAL: 425 MS
QTC INTERVAL: 387 MS

## 2025-08-28 ENCOUNTER — LAB REQUISITION (OUTPATIENT)
Dept: LAB | Facility: HOSPITAL | Age: 80
End: 2025-08-28
Payer: MEDICARE

## 2025-08-28 DIAGNOSIS — M43.10 SPONDYLOLISTHESIS, SITE UNSPECIFIED: ICD-10-CM

## 2025-08-28 LAB
ANION GAP SERPL CALCULATED.3IONS-SCNC: 10.2 MMOL/L (ref 5–15)
BASOPHILS # BLD AUTO: 0.02 10*3/MM3 (ref 0–0.2)
BASOPHILS NFR BLD AUTO: 0.3 % (ref 0–1.5)
BUN SERPL-MCNC: 39.4 MG/DL (ref 8–23)
BUN/CREAT SERPL: 16.2 (ref 7–25)
CALCIUM SPEC-SCNC: 8.8 MG/DL (ref 8.6–10.5)
CHLORIDE SERPL-SCNC: 107 MMOL/L (ref 98–107)
CO2 SERPL-SCNC: 18.8 MMOL/L (ref 22–29)
CREAT SERPL-MCNC: 2.43 MG/DL (ref 0.76–1.27)
DEPRECATED RDW RBC AUTO: 42.8 FL (ref 37–54)
EGFRCR SERPLBLD CKD-EPI 2021: 26.2 ML/MIN/1.73
EOSINOPHIL # BLD AUTO: 0.07 10*3/MM3 (ref 0–0.4)
EOSINOPHIL NFR BLD AUTO: 1 % (ref 0.3–6.2)
ERYTHROCYTE [DISTWIDTH] IN BLOOD BY AUTOMATED COUNT: 13.2 % (ref 12.3–15.4)
GLUCOSE SERPL-MCNC: 167 MG/DL (ref 65–99)
HCT VFR BLD AUTO: 29.6 % (ref 37.5–51)
HGB BLD-MCNC: 9.6 G/DL (ref 13–17.7)
IMM GRANULOCYTES # BLD AUTO: 0.05 10*3/MM3 (ref 0–0.05)
IMM GRANULOCYTES NFR BLD AUTO: 0.7 % (ref 0–0.5)
LYMPHOCYTES # BLD AUTO: 0.79 10*3/MM3 (ref 0.7–3.1)
LYMPHOCYTES NFR BLD AUTO: 10.7 % (ref 19.6–45.3)
MCH RBC QN AUTO: 28.6 PG (ref 26.6–33)
MCHC RBC AUTO-ENTMCNC: 32.4 G/DL (ref 31.5–35.7)
MCV RBC AUTO: 88.1 FL (ref 79–97)
MONOCYTES # BLD AUTO: 0.76 10*3/MM3 (ref 0.1–0.9)
MONOCYTES NFR BLD AUTO: 10.3 % (ref 5–12)
NEUTROPHILS NFR BLD AUTO: 5.67 10*3/MM3 (ref 1.7–7)
NEUTROPHILS NFR BLD AUTO: 77 % (ref 42.7–76)
NRBC BLD AUTO-RTO: 0 /100 WBC (ref 0–0.2)
PLATELET # BLD AUTO: 157 10*3/MM3 (ref 140–450)
PMV BLD AUTO: 11.8 FL (ref 6–12)
POTASSIUM SERPL-SCNC: 5.4 MMOL/L (ref 3.5–5.2)
RBC # BLD AUTO: 3.36 10*6/MM3 (ref 4.14–5.8)
SODIUM SERPL-SCNC: 136 MMOL/L (ref 136–145)
WBC NRBC COR # BLD AUTO: 7.36 10*3/MM3 (ref 3.4–10.8)

## 2025-08-28 PROCEDURE — 85025 COMPLETE CBC W/AUTO DIFF WBC: CPT | Performed by: ORTHOPAEDIC SURGERY

## 2025-08-28 PROCEDURE — 80048 BASIC METABOLIC PNL TOTAL CA: CPT | Performed by: ORTHOPAEDIC SURGERY

## 2025-08-29 PROBLEM — M54.59 INTRACTABLE LOW BACK PAIN: Status: ACTIVE | Noted: 2025-08-29

## (undated) DEVICE — DRN PENRS SIL 1/2X18IN LF

## (undated) DEVICE — SOL IRRG H2O BG 3000ML STRL

## (undated) DEVICE — ADHS SKIN PREMIERPRO EXOFIN TOPICAL HI/VISC .5ML

## (undated) DEVICE — Device

## (undated) DEVICE — SLV SCD CALF HEMOFORCE DVT THERP REPROC MD

## (undated) DEVICE — GLV SURG SIGNATURE ESSENTIAL PF LTX SZ7.5

## (undated) DEVICE — DUAL LUMEN URETERAL CATHETER

## (undated) DEVICE — GLV SURG SIGNATURE ESSENTIAL PF LTX SZ8

## (undated) DEVICE — SOL IRRIG NACL 1000ML

## (undated) DEVICE — SYS IRR PUMP SGL ACTN VAC SYR 10CC

## (undated) DEVICE — NITINOL WIRE WITH HYDROPHILIC TIP: Brand: SENSOR

## (undated) DEVICE — SPNG LAP PREWSH SFTPK 18X18IN STRL PK/5

## (undated) DEVICE — SUT VIC 3/0 SH 27IN J416H

## (undated) DEVICE — UNDERGLV SURG BIOGEL/PI PF SYNTH SURG SZ8.5 BLU 50/BX

## (undated) DEVICE — KT SURG TURNOVER 050

## (undated) DEVICE — 9165 UNIVERSAL PATIENT PLATE: Brand: 3M™

## (undated) DEVICE — APPL CHLORAPREP HI/LITE 26ML ORNG

## (undated) DEVICE — GOWN,REINFRCE,POLY,SIRUS,BREATH SLV,XXLG: Brand: MEDLINE

## (undated) DEVICE — SOL IRRIG H2O 1000ML STRL

## (undated) DEVICE — PK CYSTO 50

## (undated) DEVICE — TBG PENCL TELESCP MEGADYNE SMOKE EVAC 15FT

## (undated) DEVICE — UNDYED BRAIDED (POLYGLACTIN 910), SYNTHETIC ABSORBABLE SUTURE: Brand: COATED VICRYL

## (undated) DEVICE — SOLUTION,WATER,IRRIGATION,1000ML,STERILE: Brand: MEDLINE

## (undated) DEVICE — SUT VIC 2/0 SH 27IN

## (undated) DEVICE — SUT ETHIB 0/0 MO6 I8IN CX45D

## (undated) DEVICE — PK MINOR LAPAROTOMY 50